# Patient Record
Sex: FEMALE | Race: BLACK OR AFRICAN AMERICAN | Employment: UNEMPLOYED | URBAN - METROPOLITAN AREA
[De-identification: names, ages, dates, MRNs, and addresses within clinical notes are randomized per-mention and may not be internally consistent; named-entity substitution may affect disease eponyms.]

---

## 2017-04-17 ENCOUNTER — HOSPITAL ENCOUNTER (OUTPATIENT)
Dept: MAMMOGRAPHY | Age: 57
Discharge: HOME | End: 2017-04-17
Attending: NURSE PRACTITIONER
Payer: COMMERCIAL

## 2017-04-17 DIAGNOSIS — Z78.0 POSTMENOPAUSAL: ICD-10-CM

## 2017-04-17 PROCEDURE — 77080 DXA BONE DENSITY AXIAL: CPT

## 2017-04-19 ENCOUNTER — APPOINTMENT (OUTPATIENT)
Dept: CT IMAGING | Age: 57
End: 2017-04-19
Attending: EMERGENCY MEDICINE
Payer: COMMERCIAL

## 2017-04-19 ENCOUNTER — HOSPITAL ENCOUNTER (EMERGENCY)
Age: 57
Discharge: HOME | End: 2017-04-20
Payer: COMMERCIAL

## 2017-04-19 VITALS
TEMPERATURE: 98.1 F | BODY MASS INDEX: 45.31 KG/M2 | WEIGHT: 240 LBS | DIASTOLIC BLOOD PRESSURE: 73 MMHG | RESPIRATION RATE: 16 BRPM | SYSTOLIC BLOOD PRESSURE: 129 MMHG | HEIGHT: 61 IN | OXYGEN SATURATION: 100 % | HEART RATE: 62 BPM

## 2017-04-19 DIAGNOSIS — R10.9 ACUTE LEFT FLANK PAIN: Primary | ICD-10-CM

## 2017-04-19 LAB
BASOPHILS # BLD AUTO: 0 K/UL (ref 0–0.2)
BASOPHILS # BLD: 1 % (ref 0–2)
DIFFERENTIAL METHOD BLD: ABNORMAL
EOSINOPHIL # BLD: 0.1 K/UL (ref 0–0.8)
EOSINOPHIL NFR BLD: 2 % (ref 0.5–7.8)
ERYTHROCYTE [DISTWIDTH] IN BLOOD BY AUTOMATED COUNT: 14.9 % (ref 11.9–14.6)
HCT VFR BLD AUTO: 38.3 % (ref 35.8–46.3)
HGB BLD-MCNC: 12.9 G/DL (ref 11.7–15.4)
IMM GRANULOCYTES # BLD: 0 K/UL (ref 0–0.5)
IMM GRANULOCYTES NFR BLD AUTO: 0 % (ref 0–5)
LYMPHOCYTES # BLD AUTO: 63 % (ref 13–44)
LYMPHOCYTES # BLD: 2.6 K/UL (ref 0.5–4.6)
MCH RBC QN AUTO: 29.3 PG (ref 26.1–32.9)
MCHC RBC AUTO-ENTMCNC: 33.7 G/DL (ref 31.4–35)
MCV RBC AUTO: 86.8 FL (ref 79.6–97.8)
MONOCYTES # BLD: 0.3 K/UL (ref 0.1–1.3)
MONOCYTES NFR BLD AUTO: 8 % (ref 4–12)
NEUTS SEG # BLD: 1.1 K/UL (ref 1.7–8.2)
NEUTS SEG NFR BLD AUTO: 26 % (ref 43–78)
PLATELET # BLD AUTO: 303 K/UL (ref 150–450)
PMV BLD AUTO: 10.4 FL (ref 10.8–14.1)
RBC # BLD AUTO: 4.41 M/UL (ref 4.05–5.25)
WBC # BLD AUTO: 4.1 K/UL (ref 4.3–11.1)

## 2017-04-19 PROCEDURE — 85025 COMPLETE CBC W/AUTO DIFF WBC: CPT

## 2017-04-19 PROCEDURE — 80053 COMPREHEN METABOLIC PANEL: CPT

## 2017-04-19 PROCEDURE — 81003 URINALYSIS AUTO W/O SCOPE: CPT

## 2017-04-19 PROCEDURE — 74176 CT ABD & PELVIS W/O CONTRAST: CPT

## 2017-04-19 PROCEDURE — 99283 EMERGENCY DEPT VISIT LOW MDM: CPT

## 2017-04-20 LAB
ALBUMIN SERPL BCP-MCNC: 3.9 G/DL (ref 3.5–5)
ALBUMIN/GLOB SERPL: 1.1 {RATIO} (ref 1.2–3.5)
ALP SERPL-CCNC: 78 U/L (ref 50–136)
ALT SERPL-CCNC: 34 U/L (ref 12–65)
ANION GAP BLD CALC-SCNC: 8 MMOL/L (ref 7–16)
AST SERPL W P-5'-P-CCNC: 29 U/L (ref 15–37)
BILIRUB SERPL-MCNC: 0.3 MG/DL (ref 0.2–1.1)
BUN SERPL-MCNC: 15 MG/DL (ref 6–23)
CALCIUM SERPL-MCNC: 8.2 MG/DL (ref 8.3–10.4)
CHLORIDE SERPL-SCNC: 106 MMOL/L (ref 98–107)
CO2 SERPL-SCNC: 24 MMOL/L (ref 21–32)
CREAT SERPL-MCNC: 0.94 MG/DL (ref 0.6–1)
GLOBULIN SER CALC-MCNC: 3.6 G/DL (ref 2.3–3.5)
GLUCOSE SERPL-MCNC: 94 MG/DL (ref 65–100)
POTASSIUM SERPL-SCNC: 4.2 MMOL/L (ref 3.5–5.1)
PROT SERPL-MCNC: 7.5 G/DL (ref 6.3–8.2)
SODIUM SERPL-SCNC: 138 MMOL/L (ref 136–145)

## 2017-04-20 RX ORDER — TRAMADOL HYDROCHLORIDE 50 MG/1
50 TABLET ORAL
Qty: 10 TAB | Refills: 0 | Status: SHIPPED | OUTPATIENT
Start: 2017-04-20 | End: 2018-04-10

## 2017-04-20 RX ORDER — PHENAZOPYRIDINE HYDROCHLORIDE 200 MG/1
200 TABLET, FILM COATED ORAL 3 TIMES DAILY
Qty: 6 TAB | Refills: 0 | Status: SHIPPED | OUTPATIENT
Start: 2017-04-20 | End: 2017-04-22

## 2017-04-20 NOTE — ED PROVIDER NOTES
HPI Comments: 78-year-old female left flank pain. Patient was seen in an ER earlier and diagnosed with a UTI started on antibiotics however continues to have some increasing pain left flank. Patient is a 55 y.o. female presenting with flank pain. The history is provided by the patient. Flank Pain    This is a new problem. The current episode started more than 2 days ago. The problem has not changed since onset. The problem occurs constantly. The pain is associated with no known injury. The quality of the pain is described as aching. The pain is at a severity of 10/10. The pain is severe. Associated symptoms include dysuria. Pertinent negatives include no chest pain, no fever, no headaches, no abdominal pain, no abdominal swelling, no bladder incontinence, no paresthesias and no paresis. She has tried nothing for the symptoms. Risk factors include a history of osteoporosis and obesity. The patient's surgical history non-contributory        Past Medical History:   Diagnosis Date    Depression 10/22/2015    Gastrointestinal disorder     GERD    Other ill-defined conditions     anemic       Past Surgical History:   Procedure Laterality Date    HX CHOLECYSTECTOMY      HX GASTRIC BYPASS  2005    [facility]    HX ORTHOPAEDIC      right ankle    HX OTHER SURGICAL      gastric bypass    HX TONSILLECTOMY      HX TUBAL LIGATION           Family History:   Problem Relation Age of Onset    Diabetes Mother     Heart Disease Mother     Hypertension Mother     Hypertension Father     Cancer Father      brain       Social History     Social History    Marital status: SINGLE     Spouse name: N/A    Number of children: N/A    Years of education: N/A     Occupational History    Not on file.      Social History Main Topics    Smoking status: Never Smoker    Smokeless tobacco: Never Used    Alcohol use Yes      Comment: occasionally    Drug use: No    Sexual activity: Yes     Partners: Male     Birth control/ protection: Surgical     Other Topics Concern    Not on file     Social History Narrative         ALLERGIES: Oxycodone and Phenergan [promethazine]    Review of Systems   Constitutional: Negative. Negative for activity change and fever. HENT: Negative. Eyes: Negative. Respiratory: Negative. Cardiovascular: Negative. Negative for chest pain. Gastrointestinal: Negative. Negative for abdominal pain. Genitourinary: Positive for dysuria and flank pain. Negative for bladder incontinence. Skin: Negative. Neurological: Negative. Negative for headaches and paresthesias. Psychiatric/Behavioral: Negative. All other systems reviewed and are negative. Vitals:    04/19/17 2124   BP: 129/73   Pulse: 62   Resp: 16   Temp: 98.1 °F (36.7 °C)   SpO2: 100%   Weight: 108.9 kg (240 lb)   Height: 5' 1\" (1.549 m)            Physical Exam   Constitutional: She is oriented to person, place, and time. She appears well-developed and well-nourished. No distress. HENT:   Head: Normocephalic and atraumatic. Right Ear: External ear normal.   Left Ear: External ear normal.   Nose: Nose normal.   Mouth/Throat: Oropharynx is clear and moist. No oropharyngeal exudate. Eyes: Conjunctivae and EOM are normal. Pupils are equal, round, and reactive to light. Right eye exhibits no discharge. Left eye exhibits no discharge. No scleral icterus. Neck: Normal range of motion. Neck supple. No JVD present. No tracheal deviation present. Cardiovascular: Normal rate, regular rhythm and intact distal pulses. Pulmonary/Chest: Effort normal and breath sounds normal. No stridor. No respiratory distress. She has no wheezes. She exhibits no tenderness. Abdominal: Soft. Bowel sounds are normal. She exhibits no distension and no mass. There is no tenderness. Musculoskeletal: Normal range of motion. She exhibits no edema or tenderness. Neurological: She is alert and oriented to person, place, and time.  No cranial nerve deficit. Skin: Skin is warm and dry. No rash noted. She is not diaphoretic. No erythema. No pallor. Psychiatric: She has a normal mood and affect. Her behavior is normal. Thought content normal.   Nursing note and vitals reviewed.        MDM  Number of Diagnoses or Management Options     Amount and/or Complexity of Data Reviewed  Clinical lab tests: ordered and reviewed  Tests in the radiology section of CPT®: ordered and reviewed  Tests in the medicine section of CPT®: ordered and reviewed    Risk of Complications, Morbidity, and/or Mortality  Presenting problems: high  Diagnostic procedures: high  Management options: high      ED Course       Procedures

## 2017-04-20 NOTE — ED TRIAGE NOTES
Pt reports flank pain to left side x about 3 weeks. Pt states she developed urinary frequency. Pt states she was seen by primary care doctor and told uti. Pt states she has been on bactrim for 9 days. Pt reports continued left flank pain. Pt states history of kidney stones. Pt states pain feels similar.

## 2018-04-10 PROBLEM — E66.01 OBESITY, MORBID (HCC): Status: ACTIVE | Noted: 2018-04-10

## 2019-07-23 ENCOUNTER — HOSPITAL ENCOUNTER (OUTPATIENT)
Dept: GENERAL RADIOLOGY | Age: 59
Discharge: HOME | End: 2019-07-23
Attending: NURSE PRACTITIONER
Payer: MEDICARE
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
## 2019-07-23 DIAGNOSIS — M25.562 ACUTE PAIN OF LEFT KNEE: ICD-10-CM

## 2019-07-23 PROCEDURE — 73560 X-RAY EXAM OF KNEE 1 OR 2: CPT

## 2020-05-28 ENCOUNTER — HOSPITAL ENCOUNTER (OUTPATIENT)
Dept: MAMMOGRAPHY | Age: 60
Discharge: HOME | End: 2020-05-28
Attending: NURSE PRACTITIONER

## 2020-05-28 DIAGNOSIS — Z12.39 SCREENING FOR MALIGNANT NEOPLASM OF BREAST: ICD-10-CM

## 2022-03-19 PROBLEM — E66.01 OBESITY, MORBID (HCC): Status: ACTIVE | Noted: 2018-04-10

## 2022-08-26 ENCOUNTER — TELEPHONE (OUTPATIENT)
Dept: ORTHOPEDIC SURGERY | Age: 62
End: 2022-08-26

## 2022-08-26 NOTE — TELEPHONE ENCOUNTER
Called and LVM to return call to ask about previous treatment. Appears she had MRI L shoulder at [facility] ordered by Dr. Gerson Go. Need to know about any previous treatment, especially sx, on shoulder.

## 2022-08-31 ENCOUNTER — OFFICE VISIT (OUTPATIENT)
Dept: ORTHOPEDIC SURGERY | Age: 62
End: 2022-08-31
Payer: COMMERCIAL

## 2022-08-31 VITALS — WEIGHT: 209 LBS | HEIGHT: 61 IN | BODY MASS INDEX: 39.46 KG/M2

## 2022-08-31 DIAGNOSIS — M75.122 NONTRAUMATIC COMPLETE TEAR OF LEFT ROTATOR CUFF: Primary | ICD-10-CM

## 2022-08-31 DIAGNOSIS — M19.012 DEGENERATIVE JOINT DISEASE OF LEFT ACROMIOCLAVICULAR JOINT: ICD-10-CM

## 2022-08-31 DIAGNOSIS — M75.22 BICIPITAL TENDINITIS OF LEFT SHOULDER: ICD-10-CM

## 2022-08-31 PROCEDURE — 99204 OFFICE O/P NEW MOD 45 MIN: CPT | Performed by: ORTHOPAEDIC SURGERY

## 2022-08-31 NOTE — PROGRESS NOTES
Name: Jena Conway  YOB: 1960  Gender: female  MRN: [id number]      What: Left shoulder pain  How: Insidious onset  When: 4 months duration but worse over the past month    Referring provider: Dr. Akshat Alicea    HPI: Jena Conway is a 62 y.o. left-hand-dominant female seen at the request of Dr. Akshat Alicea for left shoulder problems. She notes a several month history of left shoulder pain. She denies any trauma. She has pain pain at night pain that wakes her up at night difficulty and snapping her bra and weakness. She carries a diagnosis of chronic pain on Norco 10 3 times a day. She denies any other major medical issues. ROS/Meds/PSH/PMH/FH/SH: A ten system review of systems was performed and is negative other than what is in the HPI. Tobacco:  reports that she has never smoked. She has never used smokeless tobacco.  There were no vitals taken for this visit. Physical Examination:  She is an awake alert female ambulating without difficulty    The right shoulder has 0 to 180 degrees of active and 0 to 180 degrees passive forward elevation. Internal rotation is to T6. External rotation is to 60 degrees at the side. In the 90 degree abducted position 90 degrees of external and 90 degrees internal rotation  The AC joint is non-tender  SC joint is non-tender. Greater tuberosity is non-tender. negative biceps  Negative O'Briens sign  negative lift-off sign  Negative belly press sign  Negative bear huggers sign  negative drop sign  negative hornblower's sign  No posterior glenohumeral joint line tenderness. No evident excessive external rotation  Rotator cuff strength is 5/5.  negative external rotation stress test.   Negative empty can sign  There is no evident anterior or posterior apprehension with a negative sulcus sign. No instability  negative external and internal Rotation lag sign  Neurovascularly intact.       The left shoulder has 0 to 150 degrees of active and 0 to 180 degrees passive forward elevation. Pain in the overhead position  Internal rotation is to T6. External rotation is to 60 degrees at the side. In the 90 degree abducted position 90 degrees of external and 90 degrees internal rotation  The AC joint is tender  SC joint is non-tender. Greater tuberosity is tender. Positive bicipital stress test negative Omid sign  Negative O'Briens sign  negative lift-off sign  Negative belly press sign  Negative bear huggers sign  negative drop sign  negative hornblower's sign  No posterior glenohumeral joint line tenderness. No evident excessive external rotation  Rotator cuff strength is 5-/5 with weakness  Positive external rotation stress test.   Positive empty can sign  There is no evident anterior or posterior apprehension with a negative sulcus sign. No instability  negative external and internal Rotation lag sign  Neurovascularly intact. Data Reviewed:          XR: AP AP scapular outlet throwers and axillary views left shoulder   Clinical Indication    ICD-10-CM    1. Nontraumatic complete tear of left rotator cuff  M75.122 XR SHOULDER LEFT (MIN 2 VIEWS)      2. Bicipital tendinitis of left shoulder  M75.22       3. Degenerative joint disease of left acromioclavicular joint  M19.012          Report: AP AP scapular outlet throws and axillary views left shoulder demonstrate a type II acromion. Degenerative changes in the [facility] joint. Glenohumeral joint spaces preserved. No fracture. No dislocation. Impression: AC OA left shoulder   Nina Benitez MD       MRI left shoulder dated 7/18/2022 demonstrates a type II acromion. Degenerative changes AC joint. A full-thickness rotator cuff tear. Fluid around the biceps tendon. No atrophy. Glenohumeral reticular cartilage is intact. Impression:   1. Nontraumatic complete tear of left rotator cuff    2. Bicipital tendinitis of left shoulder    3.  Degenerative joint disease of left acromioclavicular joint       Chronic pain on Norco 10 mg 3 times a day  Overweight 5 foot 1 209 pounds BMI 39 status post gastric bypass    Plan:   I discussed the problem with the patient. I discussed nonoperative versus operative intervention including injections. She has a symptomatic rotator cuff tear left shoulder that is affecting her quality life. In my opinion she has exhausted nonoperative modalities. She would be a candidate for arthroscopy left shoulder ASD without acromioplasty,  ADCR, extensive debridement SLAP tear, biceps labral complex, glenohumeral joint, subacromial space, mini open rotator cuff repair and biceps tenodesis. This would be performed utilizing general anesthesia with interscalene block on an outpatient basis. I would measure a hemoglobin A1c and a fasting blood glucose. I discussed the risks and benefits of the procedure with her and expected outcomes particularly in light of her pain issues. We will be very careful about her narcotic utilization in the postoperative period. We will handle her narcotic pain medication and then returned her back to pain management when we feel it is appropriate. Given her obesity and narcotic pain issues and utilization she is at increased risk for postoperative complications. Again I had a lengthy discussion we will proceed as outlined above  4 This is a chronic illness/condition with exacerbation and progression    Follow up: No follow-ups on file.      Vicente Cardoza MD

## 2022-10-17 DIAGNOSIS — M75.122 NONTRAUMATIC COMPLETE TEAR OF LEFT ROTATOR CUFF: Primary | ICD-10-CM

## 2022-10-17 PROBLEM — M19.012 DEGENERATIVE JOINT DISEASE OF LEFT ACROMIOCLAVICULAR JOINT: Status: ACTIVE | Noted: 2022-10-17

## 2022-10-17 PROBLEM — M75.22 BICIPITAL TENDINITIS OF LEFT SHOULDER: Status: ACTIVE | Noted: 2022-10-17

## 2022-11-14 RX ORDER — FLUOXETINE HYDROCHLORIDE 20 MG/1
CAPSULE ORAL 2 TIMES DAILY
COMMUNITY
Start: 2022-09-30

## 2022-11-14 RX ORDER — TOPIRAMATE 25 MG/1
25 TABLET ORAL 2 TIMES DAILY
COMMUNITY
Start: 2022-09-29

## 2022-11-14 RX ORDER — METHOCARBAMOL 750 MG/1
TABLET, FILM COATED ORAL 2 TIMES DAILY PRN
COMMUNITY
Start: 2022-10-06

## 2022-11-14 RX ORDER — ACETAMINOPHEN 500 MG
500 TABLET ORAL EVERY 6 HOURS PRN
COMMUNITY
End: 2022-11-14

## 2022-11-14 RX ORDER — PHENTERMINE HYDROCHLORIDE 37.5 MG/1
TABLET ORAL
COMMUNITY
Start: 2022-09-02

## 2022-11-14 RX ORDER — TEMAZEPAM 30 MG/1
CAPSULE ORAL NIGHTLY
COMMUNITY
Start: 2022-10-07

## 2022-11-14 NOTE — PROGRESS NOTES
Patient verified name and . Order for consent not found in EHR and unable to match consent with case posting; patient verifies procedure. Type 1b surgery, phone assessment complete. Orders not received. Labs per surgeon: unknown, no orders  Labs per anesthesia protocol: none    Patient answered medical/surgical history questions at their best of ability. All prior to admission medications documented in [facility]. Patient instructed to take the following medications the day of surgery according to anesthesia guidelines with a small sip of water: Prozac, Omeprazole; Norco, if needed On the day before surgery please take Acetaminophen 1000mg in the morning and then again before bed. You may substitute for Tylenol 650 mg. Hold all vitamins 7 days prior to surgery and NSAIDS 5 days prior to surgery. Prescription meds to hold:Celebrex and weight loss medications for 5 days prior to surgery  Patient instructed on the following:    > Arrive at A Entrance, time of arrival to be called the day before by 1700  > NPO after midnight, unless otherwise indicated, including gum, mints, and ice chips  > Responsible adult must drive patient to the hospital, stay during surgery, and patient will need supervision 24 hours after anesthesia  > Use Hibiclens in shower the night before surgery and on the morning of surgery  > All piercings must be removed prior to arrival.    > Leave all valuables (money and jewelry) at home but bring insurance card and ID on DOS.   > You may be required to pay a deductible or co-pay on the day of your procedure. You can pre-pay by calling [phone] if your surgery is at the [facility] or [phone] if your surgery is at the [facility]. > Do not wear make-up, nail polish, lotions, cologne, perfumes, powders, or oil on skin. Artificial nails are not permitted.

## 2022-11-15 NOTE — H&P
Subjective:     Patient is a 62 y.o. LHD FEMALE WITH LEFT SHOULDER PAIN. SEE OFFICE NOTE. Patient Active Problem List    Diagnosis Date Noted    Nontraumatic complete tear of left rotator cuff 10/17/2022    Bicipital tendinitis of left shoulder 10/17/2022    Degenerative joint disease of left acromioclavicular joint 10/17/2022    Obesity, morbid ([address] 04/10/2018    Menopausal symptoms 05/05/2016    Right ankle pain 10/22/2015    Gastroesophageal reflux disease without esophagitis 10/22/2015    Depression 10/22/2015     Past Medical History:   Diagnosis Date    Abnormal Pap smear of cervix     Degenerative joint disease of left acromioclavicular joint 10/17/2022    Depression 10/22/2015    Gastrointestinal disorder     GERD    Other ill-defined conditions(799.89)     anemic    Prolonged emergence from general anesthesia       Past Surgical History:   Procedure Laterality Date    CHOLECYSTECTOMY      COLONOSCOPY      due again in [facility]  2005    [facility]    ORTHOPEDIC SURGERY      right ankle    OTHER SURGICAL HISTORY      gastric bypass    TONSILLECTOMY      TUBAL LIGATION        Prior to Admission medications    Medication Sig Start Date End Date Taking? Authorizing Provider   Multiple Vitamin (MULTIVITAMIN ADULT PO) Take by mouth daily   Yes Historical Provider, MD   Acetaminophen (TYLENOL ARTHRITIS PAIN PO) Take by mouth as needed   Yes Historical Provider, MD   topiramate (TOPAMAX) 25 MG tablet 25 mg in the morning and at bedtime 9/29/22   Historical Provider, MD   phentermine (ADIPEX-P) 37.5 MG tablet TAKE 0.5 TABLETS BY MOUTH TWICE A DAY 9/2/22   Historical Provider, MD   temazepam (RESTORIL) 30 MG capsule nightly.  10/7/22   Historical Provider, MD   FLUoxetine (PROZAC) 20 MG capsule in the morning and at bedtime 9/30/22   Historical Provider, MD   methocarbamol (ROBAXIN) 750 MG tablet 2 times daily as needed 10/6/22   Historical Provider, MD   celecoxib (CELEBREX) 100 MG capsule Take 100 mg by mouth 2 times daily TAKE 1 CAPSULE BY MOUTH TWICE A DAY 11/6/19   Ar Automatic Reconciliation   HYDROcodone-acetaminophen (NORCO)  MG per tablet Take by mouth. Ar Automatic Reconciliation   lubiprostone (AMITIZA) 24 MCG capsule Take 24 mcg by mouth 2 times daily (with meals) TAKE 1 CAPSULE BY MOUTH TWO (2) TIMES DAILY (WITH MEALS). 8/18/20   Ar Automatic Reconciliation   omeprazole (PRILOSEC) 40 MG delayed release capsule TAKE 1 CAPSULE BY MOUTH EVERY DAY 5/14/20   Ar Automatic Reconciliation   ondansetron (ZOFRAN-ODT) 4 MG disintegrating tablet TAKE 1 TABLET BY MOUTH EVERY 8 HOURS AS NEEDED FOR NAUSEA 5/14/20   Ar Automatic Reconciliation   tiZANidine (ZANAFLEX) 4 MG tablet Take 4 mg by mouth 2 times daily 3/28/19   Ar Automatic Reconciliation     Allergies   Allergen Reactions    Oxycodone Itching    Promethazine Other (See Comments)     Makes her jump    Sulfur Other (See Comments)     Yeast infection    Sulfa Antibiotics Rash      Social History     Tobacco Use    Smoking status: Never     Passive exposure: Never    Smokeless tobacco: Never   Substance Use Topics    Alcohol use: Yes     Comment: occasionally      Family History   Problem Relation Age of Onset    Hypertension Mother     Diabetes Mother     Heart Disease Mother     Hypertension Father     Cancer Father         brain      Review of Systems  Pertinent items are noted in HPI. Objective:     No data found.   Ht 5' 1\" (1.549 m)   Wt 206 lb (93.4 kg)   BMI 38.92 kg/m²     General Appearance:    Alert, cooperative, no distress, appears stated age   Head:    Normocephalic, without obvious abnormality, atraumatic                       Back:     Symmetric, no curvature, ROM normal, no CVA tenderness   Lungs:     Clear to auscultation bilaterally, respirations unlabored   Chest Wall:    No tenderness or deformity    Heart:    Regular rate and rhythm, S1 and S2 normal, no murmur, rub   or gallop Extremities:   Extremities normal, atraumatic, no cyanosis or edema   Pulses:   2+ and symmetric all extremities   Skin:   Skin color, texture, turgor normal, no rashes or lesions   Lymph nodes:   Cervical, supraclavicular, and axillary nodes normal   Neurologic:   CNII-XII intact, normal strength, sensation and reflexes     throughout         Assessment:     Active Problems:    Nontraumatic complete tear of left rotator cuff    Bicipital tendinitis of left shoulder    Degenerative joint disease of left acromioclavicular joint  Resolved Problems:    * No resolved hospital problems. *      Plan:     The various methods of treatment have been discussed with the patient and family. PATIENT HAS EXHAUSTED NON-OPERATIVE MODALITIES     After consideration of risks, benefits and other options for treatment, the patient has consented to surgical intervention.     SEE OFFICE NOTE    Keenan Smith MD

## 2022-11-18 ENCOUNTER — HOSPITAL ENCOUNTER (OUTPATIENT)
Age: 62
Setting detail: OUTPATIENT SURGERY
Discharge: HOME | End: 2022-11-18
Attending: ORTHOPAEDIC SURGERY | Admitting: ORTHOPAEDIC SURGERY
Payer: MEDICARE

## 2022-11-18 ENCOUNTER — ANESTHESIA EVENT (OUTPATIENT)
Dept: SURGERY | Age: 62
End: 2022-11-18
Payer: MEDICARE

## 2022-11-18 ENCOUNTER — PREP FOR PROCEDURE (OUTPATIENT)
Dept: ORTHOPEDIC SURGERY | Age: 62
End: 2022-11-18

## 2022-11-18 ENCOUNTER — APPOINTMENT (OUTPATIENT)
Dept: GENERAL RADIOLOGY | Age: 62
End: 2022-11-18
Attending: ORTHOPAEDIC SURGERY
Payer: MEDICARE

## 2022-11-18 ENCOUNTER — ANESTHESIA (OUTPATIENT)
Dept: SURGERY | Age: 62
End: 2022-11-18
Payer: MEDICARE

## 2022-11-18 VITALS
WEIGHT: 210 LBS | TEMPERATURE: 97.8 F | SYSTOLIC BLOOD PRESSURE: 164 MMHG | RESPIRATION RATE: 16 BRPM | BODY MASS INDEX: 39.65 KG/M2 | HEART RATE: 51 BPM | HEIGHT: 61 IN | OXYGEN SATURATION: 96 % | DIASTOLIC BLOOD PRESSURE: 70 MMHG

## 2022-11-18 DIAGNOSIS — M75.122 NONTRAUMATIC COMPLETE TEAR OF LEFT ROTATOR CUFF: ICD-10-CM

## 2022-11-18 DIAGNOSIS — M19.012 DEGENERATIVE JOINT DISEASE OF LEFT ACROMIOCLAVICULAR JOINT: ICD-10-CM

## 2022-11-18 DIAGNOSIS — M75.122 NONTRAUMATIC COMPLETE TEAR OF LEFT ROTATOR CUFF: Primary | ICD-10-CM

## 2022-11-18 DIAGNOSIS — M75.22 BICIPITAL TENDINITIS OF LEFT SHOULDER: ICD-10-CM

## 2022-11-18 PROBLEM — S43.432A SUPERIOR GLENOID LABRUM LESION OF LEFT SHOULDER: Status: ACTIVE | Noted: 2022-11-18

## 2022-11-18 LAB
EST. AVERAGE GLUCOSE BLD GHB EST-MCNC: 120 MG/DL
GLUCOSE BLD STRIP.AUTO-MCNC: 90 MG/DL (ref 65–100)
HBA1C MFR BLD: 5.8 % (ref 4.8–5.6)
SERVICE CMNT-IMP: NORMAL

## 2022-11-18 PROCEDURE — 7100000001 HC PACU RECOVERY - ADDTL 15 MIN: Performed by: ORTHOPAEDIC SURGERY

## 2022-11-18 PROCEDURE — 6360000002 HC RX W HCPCS: Performed by: ANESTHESIOLOGY

## 2022-11-18 PROCEDURE — 6360000002 HC RX W HCPCS: Performed by: NURSE ANESTHETIST, CERTIFIED REGISTERED

## 2022-11-18 PROCEDURE — 3600000014 HC SURGERY LEVEL 4 ADDTL 15MIN: Performed by: ORTHOPAEDIC SURGERY

## 2022-11-18 PROCEDURE — 83036 HEMOGLOBIN GLYCOSYLATED A1C: CPT

## 2022-11-18 PROCEDURE — 7100000000 HC PACU RECOVERY - FIRST 15 MIN: Performed by: ORTHOPAEDIC SURGERY

## 2022-11-18 PROCEDURE — 6370000000 HC RX 637 (ALT 250 FOR IP): Performed by: ANESTHESIOLOGY

## 2022-11-18 PROCEDURE — 73030 X-RAY EXAM OF SHOULDER: CPT

## 2022-11-18 PROCEDURE — 2500000003 HC RX 250 WO HCPCS: Performed by: ANESTHESIOLOGY

## 2022-11-18 PROCEDURE — 29824 SHO ARTHRS SRG DSTL CLAVICLC: CPT | Performed by: ORTHOPAEDIC SURGERY

## 2022-11-18 PROCEDURE — 2580000003 HC RX 258: Performed by: ANESTHESIOLOGY

## 2022-11-18 PROCEDURE — 2500000003 HC RX 250 WO HCPCS: Performed by: NURSE ANESTHETIST, CERTIFIED REGISTERED

## 2022-11-18 PROCEDURE — 2500000003 HC RX 250 WO HCPCS: Performed by: ORTHOPAEDIC SURGERY

## 2022-11-18 PROCEDURE — 23412 REPAIR ROTATOR CUFF CHRONIC: CPT | Performed by: ORTHOPAEDIC SURGERY

## 2022-11-18 PROCEDURE — 29823 SHO ARTHRS SRG XTNSV DBRDMT: CPT | Performed by: ORTHOPAEDIC SURGERY

## 2022-11-18 PROCEDURE — 3700000001 HC ADD 15 MINUTES (ANESTHESIA): Performed by: ORTHOPAEDIC SURGERY

## 2022-11-18 PROCEDURE — 3600000004 HC SURGERY LEVEL 4 BASE: Performed by: ORTHOPAEDIC SURGERY

## 2022-11-18 PROCEDURE — C1713 ANCHOR/SCREW BN/BN,TIS/BN: HCPCS | Performed by: ORTHOPAEDIC SURGERY

## 2022-11-18 PROCEDURE — 82962 GLUCOSE BLOOD TEST: CPT

## 2022-11-18 PROCEDURE — 2580000003 HC RX 258: Performed by: NURSE ANESTHETIST, CERTIFIED REGISTERED

## 2022-11-18 PROCEDURE — 76942 ECHO GUIDE FOR BIOPSY: CPT | Performed by: ANESTHESIOLOGY

## 2022-11-18 PROCEDURE — 2709999900 HC NON-CHARGEABLE SUPPLY: Performed by: ORTHOPAEDIC SURGERY

## 2022-11-18 PROCEDURE — 23430 REPAIR BICEPS TENDON: CPT | Performed by: ORTHOPAEDIC SURGERY

## 2022-11-18 PROCEDURE — 3700000000 HC ANESTHESIA ATTENDED CARE: Performed by: ORTHOPAEDIC SURGERY

## 2022-11-18 PROCEDURE — 6360000002 HC RX W HCPCS: Performed by: ORTHOPAEDIC SURGERY

## 2022-11-18 DEVICE — 5.5MM PEEK ZIP SUTURE ANCHOR WITH ¿ CIRCLE TAPER NEEDLES, #2 FORCE FIBER
Type: IMPLANTABLE DEVICE | Site: SHOULDER | Status: FUNCTIONAL
Brand: PEEK ZIP

## 2022-11-18 DEVICE — OMEGA 4.75MM PEEK KNOTLESS ANCHOR SYSTEM, SINGLE
Type: IMPLANTABLE DEVICE | Site: SHOULDER | Status: FUNCTIONAL
Brand: OMEGA

## 2022-11-18 DEVICE — ICONIX 2 NEEDLES WITH INTELLIBRAID TECHNOLOGY, 2.3MM ANCHOR WITH 2 STRANDS #2 FORCE FIBER
Type: IMPLANTABLE DEVICE | Site: SHOULDER | Status: FUNCTIONAL
Brand: ICONIX

## 2022-11-18 RX ORDER — KETOROLAC TROMETHAMINE 30 MG/ML
INJECTION, SOLUTION INTRAMUSCULAR; INTRAVENOUS PRN
Status: DISCONTINUED | OUTPATIENT
Start: 2022-11-18 | End: 2022-11-18 | Stop reason: SDUPTHER

## 2022-11-18 RX ORDER — HYDROMORPHONE HYDROCHLORIDE 1 MG/ML
0.5 INJECTION, SOLUTION INTRAMUSCULAR; INTRAVENOUS; SUBCUTANEOUS EVERY 5 MIN PRN
Status: DISCONTINUED | OUTPATIENT
Start: 2022-11-18 | End: 2022-11-21 | Stop reason: HOSPADM

## 2022-11-18 RX ORDER — ONDANSETRON 2 MG/ML
4 INJECTION INTRAMUSCULAR; INTRAVENOUS
Status: COMPLETED | OUTPATIENT
Start: 2022-11-18 | End: 2022-11-18

## 2022-11-18 RX ORDER — ONDANSETRON 4 MG/1
4 TABLET, FILM COATED ORAL EVERY 8 HOURS PRN
Qty: 20 TABLET | Refills: 0 | Status: SHIPPED | OUTPATIENT
Start: 2022-11-18

## 2022-11-18 RX ORDER — SODIUM CHLORIDE 0.9 % (FLUSH) 0.9 %
5-40 SYRINGE (ML) INJECTION PRN
Status: DISCONTINUED | OUTPATIENT
Start: 2022-11-18 | End: 2022-11-21 | Stop reason: HOSPADM

## 2022-11-18 RX ORDER — OXYCODONE HYDROCHLORIDE 5 MG/1
5 TABLET ORAL PRN
Status: COMPLETED | OUTPATIENT
Start: 2022-11-18 | End: 2022-11-18

## 2022-11-18 RX ORDER — SODIUM CHLORIDE 9 MG/ML
INJECTION, SOLUTION INTRAVENOUS PRN
Status: CANCELLED | OUTPATIENT
Start: 2022-11-18

## 2022-11-18 RX ORDER — OXYCODONE HYDROCHLORIDE 5 MG/1
10 TABLET ORAL PRN
Status: COMPLETED | OUTPATIENT
Start: 2022-11-18 | End: 2022-11-18

## 2022-11-18 RX ORDER — NEOSTIGMINE METHYLSULFATE 1 MG/ML
INJECTION, SOLUTION INTRAVENOUS PRN
Status: DISCONTINUED | OUTPATIENT
Start: 2022-11-18 | End: 2022-11-18 | Stop reason: SDUPTHER

## 2022-11-18 RX ORDER — GLYCOPYRROLATE 0.2 MG/ML
INJECTION INTRAMUSCULAR; INTRAVENOUS PRN
Status: DISCONTINUED | OUTPATIENT
Start: 2022-11-18 | End: 2022-11-18 | Stop reason: SDUPTHER

## 2022-11-18 RX ORDER — SODIUM CHLORIDE 0.9 % (FLUSH) 0.9 %
5-40 SYRINGE (ML) INJECTION PRN
Status: CANCELLED | OUTPATIENT
Start: 2022-11-18

## 2022-11-18 RX ORDER — HYDROMORPHONE HYDROCHLORIDE 2 MG/1
2 TABLET ORAL EVERY 6 HOURS PRN
Qty: 40 TABLET | Refills: 0 | Status: SHIPPED | OUTPATIENT
Start: 2022-11-18 | End: 2022-11-28

## 2022-11-18 RX ORDER — SODIUM CHLORIDE, SODIUM LACTATE, POTASSIUM CHLORIDE, CALCIUM CHLORIDE 600; 310; 30; 20 MG/100ML; MG/100ML; MG/100ML; MG/100ML
100 INJECTION, SOLUTION INTRAVENOUS CONTINUOUS
Status: DISCONTINUED | OUTPATIENT
Start: 2022-11-18 | End: 2022-11-18 | Stop reason: HOSPADM

## 2022-11-18 RX ORDER — LIDOCAINE HYDROCHLORIDE AND EPINEPHRINE BITARTRATE 20; .01 MG/ML; MG/ML
INJECTION, SOLUTION SUBCUTANEOUS PRN
Status: DISCONTINUED | OUTPATIENT
Start: 2022-11-18 | End: 2022-11-18 | Stop reason: HOSPADM

## 2022-11-18 RX ORDER — PROPOFOL 10 MG/ML
INJECTION, EMULSION INTRAVENOUS PRN
Status: DISCONTINUED | OUTPATIENT
Start: 2022-11-18 | End: 2022-11-18 | Stop reason: SDUPTHER

## 2022-11-18 RX ORDER — SODIUM CHLORIDE 0.9 % (FLUSH) 0.9 %
5-40 SYRINGE (ML) INJECTION EVERY 12 HOURS SCHEDULED
Status: CANCELLED | OUTPATIENT
Start: 2022-11-18

## 2022-11-18 RX ORDER — LIDOCAINE HYDROCHLORIDE 10 MG/ML
1 INJECTION, SOLUTION INFILTRATION; PERINEURAL
Status: COMPLETED | OUTPATIENT
Start: 2022-11-18 | End: 2022-11-18

## 2022-11-18 RX ORDER — SODIUM CHLORIDE 9 MG/ML
INJECTION, SOLUTION INTRAVENOUS PRN
Status: DISCONTINUED | OUTPATIENT
Start: 2022-11-18 | End: 2022-11-21 | Stop reason: HOSPADM

## 2022-11-18 RX ORDER — DEXAMETHASONE SODIUM PHOSPHATE 10 MG/ML
INJECTION INTRAMUSCULAR; INTRAVENOUS PRN
Status: DISCONTINUED | OUTPATIENT
Start: 2022-11-18 | End: 2022-11-18 | Stop reason: SDUPTHER

## 2022-11-18 RX ORDER — SODIUM CHLORIDE 0.9 % (FLUSH) 0.9 %
5-40 SYRINGE (ML) INJECTION EVERY 12 HOURS SCHEDULED
Status: DISCONTINUED | OUTPATIENT
Start: 2022-11-18 | End: 2022-11-21 | Stop reason: HOSPADM

## 2022-11-18 RX ORDER — LIDOCAINE HYDROCHLORIDE 20 MG/ML
INJECTION, SOLUTION EPIDURAL; INFILTRATION; INTRACAUDAL; PERINEURAL PRN
Status: DISCONTINUED | OUTPATIENT
Start: 2022-11-18 | End: 2022-11-18 | Stop reason: SDUPTHER

## 2022-11-18 RX ORDER — ROPIVACAINE HYDROCHLORIDE 10 MG/ML
INJECTION EPIDURAL; INFILTRATION; PERINEURAL PRN
Status: DISCONTINUED | OUTPATIENT
Start: 2022-11-18 | End: 2022-11-18 | Stop reason: SDUPTHER

## 2022-11-18 RX ORDER — FENTANYL CITRATE 50 UG/ML
100 INJECTION, SOLUTION INTRAMUSCULAR; INTRAVENOUS
Status: COMPLETED | OUTPATIENT
Start: 2022-11-18 | End: 2022-11-18

## 2022-11-18 RX ORDER — SODIUM CHLORIDE, SODIUM LACTATE, POTASSIUM CHLORIDE, CALCIUM CHLORIDE 600; 310; 30; 20 MG/100ML; MG/100ML; MG/100ML; MG/100ML
INJECTION, SOLUTION INTRAVENOUS CONTINUOUS PRN
Status: DISCONTINUED | OUTPATIENT
Start: 2022-11-18 | End: 2022-11-18 | Stop reason: SDUPTHER

## 2022-11-18 RX ORDER — GABAPENTIN 100 MG/1
100 CAPSULE ORAL 3 TIMES DAILY
Qty: 90 CAPSULE | Refills: 0 | Status: SHIPPED | OUTPATIENT
Start: 2022-11-18 | End: 2022-12-18

## 2022-11-18 RX ORDER — ROCURONIUM BROMIDE 10 MG/ML
INJECTION, SOLUTION INTRAVENOUS PRN
Status: DISCONTINUED | OUTPATIENT
Start: 2022-11-18 | End: 2022-11-18 | Stop reason: SDUPTHER

## 2022-11-18 RX ORDER — ONDANSETRON 2 MG/ML
INJECTION INTRAMUSCULAR; INTRAVENOUS PRN
Status: DISCONTINUED | OUTPATIENT
Start: 2022-11-18 | End: 2022-11-18 | Stop reason: SDUPTHER

## 2022-11-18 RX ORDER — SODIUM CHLORIDE, SODIUM LACTATE, POTASSIUM CHLORIDE, CALCIUM CHLORIDE 600; 310; 30; 20 MG/100ML; MG/100ML; MG/100ML; MG/100ML
INJECTION, SOLUTION INTRAVENOUS CONTINUOUS
Status: DISCONTINUED | OUTPATIENT
Start: 2022-11-18 | End: 2022-11-21 | Stop reason: HOSPADM

## 2022-11-18 RX ORDER — MIDAZOLAM HYDROCHLORIDE 2 MG/2ML
4 INJECTION, SOLUTION INTRAMUSCULAR; INTRAVENOUS
Status: COMPLETED | OUTPATIENT
Start: 2022-11-18 | End: 2022-11-18

## 2022-11-18 RX ORDER — KETOROLAC TROMETHAMINE 10 MG/1
TABLET, FILM COATED ORAL
Qty: 20 TABLET | Refills: 0 | Status: SHIPPED | OUTPATIENT
Start: 2022-11-18

## 2022-11-18 RX ADMIN — LIDOCAINE HYDROCHLORIDE 100 MG: 20 INJECTION, SOLUTION EPIDURAL; INFILTRATION; INTRACAUDAL; PERINEURAL at 13:26

## 2022-11-18 RX ADMIN — GLYCOPYRROLATE 0.4 MG: 0.2 INJECTION, SOLUTION INTRAMUSCULAR; INTRAVENOUS at 14:38

## 2022-11-18 RX ADMIN — PHENYLEPHRINE HYDROCHLORIDE 100 MCG: 0.1 INJECTION, SOLUTION INTRAVENOUS at 13:42

## 2022-11-18 RX ADMIN — SODIUM CHLORIDE, SODIUM LACTATE, POTASSIUM CHLORIDE, AND CALCIUM CHLORIDE: 600; 310; 30; 20 INJECTION, SOLUTION INTRAVENOUS at 13:19

## 2022-11-18 RX ADMIN — Medication 2 G: at 13:19

## 2022-11-18 RX ADMIN — MIDAZOLAM 2 MG: 1 INJECTION INTRAMUSCULAR; INTRAVENOUS at 11:23

## 2022-11-18 RX ADMIN — PHENYLEPHRINE HYDROCHLORIDE 100 MCG: 0.1 INJECTION, SOLUTION INTRAVENOUS at 14:02

## 2022-11-18 RX ADMIN — OXYCODONE 10 MG: 5 TABLET ORAL at 16:22

## 2022-11-18 RX ADMIN — PROPOFOL 200 MG: 10 INJECTION, EMULSION INTRAVENOUS at 13:26

## 2022-11-18 RX ADMIN — ONDANSETRON 4 MG: 2 INJECTION INTRAMUSCULAR; INTRAVENOUS at 13:34

## 2022-11-18 RX ADMIN — ROCURONIUM BROMIDE 40 MG: 50 INJECTION, SOLUTION INTRAVENOUS at 13:26

## 2022-11-18 RX ADMIN — HYDROMORPHONE HYDROCHLORIDE 0.5 MG: 1 INJECTION, SOLUTION INTRAMUSCULAR; INTRAVENOUS; SUBCUTANEOUS at 15:50

## 2022-11-18 RX ADMIN — ROPIVACAINE HYDROCHLORIDE 15 ML: 10 INJECTION, SOLUTION EPIDURAL at 11:23

## 2022-11-18 RX ADMIN — ONDANSETRON 4 MG: 2 INJECTION INTRAMUSCULAR; INTRAVENOUS at 15:00

## 2022-11-18 RX ADMIN — HYDROMORPHONE HYDROCHLORIDE 0.5 MG: 1 INJECTION, SOLUTION INTRAMUSCULAR; INTRAVENOUS; SUBCUTANEOUS at 15:35

## 2022-11-18 RX ADMIN — Medication 3 MG: at 14:38

## 2022-11-18 RX ADMIN — SODIUM CHLORIDE, POTASSIUM CHLORIDE, SODIUM LACTATE AND CALCIUM CHLORIDE 100 ML/HR: 600; 310; 30; 20 INJECTION, SOLUTION INTRAVENOUS at 10:20

## 2022-11-18 RX ADMIN — MEPIVACAINE HYDROCHLORIDE 15 ML: 15 INJECTION, SOLUTION EPIDURAL; INFILTRATION at 11:23

## 2022-11-18 RX ADMIN — LIDOCAINE HYDROCHLORIDE 1 ML: 10 INJECTION, SOLUTION INFILTRATION; PERINEURAL at 10:20

## 2022-11-18 RX ADMIN — DEXAMETHASONE SODIUM PHOSPHATE 10 MG: 10 INJECTION INTRAMUSCULAR; INTRAVENOUS at 13:34

## 2022-11-18 RX ADMIN — KETOROLAC TROMETHAMINE 30 MG: 30 INJECTION, SOLUTION INTRAMUSCULAR; INTRAVENOUS at 14:36

## 2022-11-18 RX ADMIN — FENTANYL CITRATE 50 MCG: 50 INJECTION INTRAMUSCULAR; INTRAVENOUS at 11:23

## 2022-11-18 ASSESSMENT — PAIN SCALES - GENERAL
PAINLEVEL_OUTOF10: 3
PAINLEVEL_OUTOF10: 4
PAINLEVEL_OUTOF10: 5
PAINLEVEL_OUTOF10: 4
PAINLEVEL_OUTOF10: 8
PAINLEVEL_OUTOF10: 2
PAINLEVEL_OUTOF10: 7
PAINLEVEL_OUTOF10: 2
PAINLEVEL_OUTOF10: 3
PAINLEVEL_OUTOF10: 6

## 2022-11-18 ASSESSMENT — PAIN DESCRIPTION - ORIENTATION
ORIENTATION: LEFT

## 2022-11-18 ASSESSMENT — PAIN DESCRIPTION - DESCRIPTORS
DESCRIPTORS: ACHING

## 2022-11-18 ASSESSMENT — PAIN DESCRIPTION - LOCATION: LOCATION: FINGER (COMMENT WHICH ONE);OTHER (COMMENT)

## 2022-11-18 ASSESSMENT — PAIN - FUNCTIONAL ASSESSMENT: PAIN_FUNCTIONAL_ASSESSMENT: 0-10

## 2022-11-18 NOTE — H&P
Update History & Physical    The patient's History and Physical of August 31, 2022 was reviewed with the patient and I examined the patient. There was no change. The surgical site was confirmed by the patient and me. Plan: The risks, benefits, expected outcome, and alternative to the recommended procedure have been discussed with the patient. Patient understands and wants to proceed with the procedure.      Electronically signed by Katerina Diaz MD on 11/18/2022 at 9:37 AM

## 2022-11-18 NOTE — PERIOP NOTE
Dr. Neal Ragland notified that patient c/o chest \"discomfort\" with deep breaths; Dr. Neal Ragland to bedside to speak with patient; no new orders received at this time.

## 2022-11-18 NOTE — ANESTHESIA PRE PROCEDURE
Department of Anesthesiology  Preprocedure Note       Name:  Rey Lockhart   Age:  62 y.o.  :  1960                                          MRN:  [id number]         Date:  2022      Surgeon: Krysten Hall):  Mahsa Tavera MD    Procedure: Procedure(s):  left shoulder arthroscopy, arthroscopic subacromial decompression without acromioplasty, arthroscopic distal clavicle resection, arthroscopic versus mini open rotator cuff repair and biceps tenodesis. general/interscalene    Medications prior to admission:   Prior to Admission medications    Medication Sig Start Date End Date Taking? Authorizing Provider   HYDROmorphone (DILAUDID) 2 MG tablet Take 1 tablet by mouth every 6 hours as needed for Pain for up to 10 days. 22 Yes Mahsa Tavera MD   gabapentin (NEURONTIN) 100 MG capsule Take 1 capsule by mouth 3 times daily for 30 days. 22 Yes Mahsa Tavera MD   ketorolac (TORADOL) 10 MG tablet Take 1 tablet by mouth every 6 hours for 5 days post-op 22  Yes Mahsa Tavera MD   ondansetron [facility]) 4 MG tablet Take 1 tablet by mouth every 8 hours as needed for Nausea or Vomiting 22  Yes Mahsa Tavera MD   Multiple Vitamin (MULTIVITAMIN ADULT PO) Take by mouth daily   Yes Historical Provider, MD   Acetaminophen (TYLENOL ARTHRITIS PAIN PO) Take by mouth as needed   Yes Historical Provider, MD   topiramate (TOPAMAX) 25 MG tablet 25 mg in the morning and at bedtime 22   Historical Provider, MD   phentermine (ADIPEX-P) 37.5 MG tablet TAKE 0.5 TABLETS BY MOUTH TWICE A DAY 22   Historical Provider, MD   temazepam (RESTORIL) 30 MG capsule nightly.  10/7/22   Historical Provider, MD   FLUoxetine (PROZAC) 20 MG capsule in the morning and at bedtime 22   Historical Provider, MD   methocarbamol (ROBAXIN) 750 MG tablet 2 times daily as needed 10/6/22   Historical Provider, MD   HYDROcodone-acetaminophen ([facility])  MG per tablet Take by mouth.    Ar Automatic Reconciliation   lubiprostone (AMITIZA) 24 MCG capsule Take 24 mcg by mouth 2 times daily (with meals) TAKE 1 CAPSULE BY MOUTH TWO (2) TIMES DAILY (WITH MEALS). 8/18/20   Ar Automatic Reconciliation   omeprazole (PRILOSEC) 40 MG delayed release capsule TAKE 1 CAPSULE BY MOUTH EVERY DAY 5/14/20   Ar Automatic Reconciliation   ondansetron (ZOFRAN-ODT) 4 MG disintegrating tablet TAKE 1 TABLET BY MOUTH EVERY 8 HOURS AS NEEDED FOR NAUSEA  Patient not taking: Reported on 11/18/2022 5/14/20   Ar Automatic Reconciliation   tiZANidine (ZANAFLEX) 4 MG tablet Take 4 mg by mouth 2 times daily  Patient not taking: Reported on 11/18/2022 3/28/19   Ar Automatic Reconciliation       Current medications:    Current Facility-Administered Medications   Medication Dose Route Frequency Provider Last Rate Last Admin    lactated ringers infusion  100 mL/hr IntraVENous Continuous Keshav Temple  mL/hr at 11/18/22 1020 100 mL/hr at 11/18/22 1020    ceFAZolin (ANCEF) 2000 mg in sterile water 20 mL IV syringe  2,000 mg IntraVENous On Call to Vedia Lanes., MD           Allergies: Allergies   Allergen Reactions    Oxycodone Itching    Promethazine Other (See Comments)     Makes her jump    Sulfur Other (See Comments)     Yeast infection    Sulfa Antibiotics Rash       Problem List:    Patient Active Problem List   Diagnosis Code    Right ankle pain M25.571    Obesity, morbid ([facility] [address] E66.01    Menopausal symptoms N95.1    Gastroesophageal reflux disease without esophagitis K21.9    Depression F32. A    Nontraumatic complete tear of left rotator cuff M75.122    Bicipital tendinitis of left shoulder M75.22    Degenerative joint disease of left acromioclavicular joint M19.012       Past Medical History:        Diagnosis Date    Abnormal Pap smear of cervix     Degenerative joint disease of left acromioclavicular joint 10/17/2022    Depression 10/22/2015    Gastrointestinal disorder GERD    Other ill-defined conditions(799.89)     anemic    Prolonged emergence from general anesthesia        Past Surgical History:        Procedure Laterality Date    CHOLECYSTECTOMY      COLONOSCOPY      due again in 2020   [address] GASTRIC BYPASS SURGERY  2005    [place]    ORTHOPEDIC SURGERY      right ankle    OTHER SURGICAL HISTORY      gastric bypass    TONSILLECTOMY      TUBAL LIGATION         Social History:    Social History     Tobacco Use    Smoking status: Never     Passive exposure: Never    Smokeless tobacco: Never   Substance Use Topics    Alcohol use: Yes     Comment: occasionally                                Counseling given: Not Answered      Vital Signs (Current):   Vitals:    11/18/22 1120 11/18/22 1125 11/18/22 1130 11/18/22 1135   BP: 133/78 101/60 (!) 106/50 (!) 108/54   Pulse: 58 58 54 72   Resp: 16 16 16 16   Temp:       TempSrc:       SpO2:       Weight:       Height:                                                  BP Readings from Last 3 Encounters:   11/18/22 (!) 108/54       NPO Status: Time of last liquid consumption: 0230                        Time of last solid consumption: 2359                        Date of last liquid consumption: 11/18/22                        Date of last solid food consumption: 11/17/22    BMI:   Wt Readings from Last 3 Encounters:   11/18/22 210 lb (95.3 kg)   08/31/22 209 lb (94.8 kg)     Body mass index is 39.68 kg/m².     CBC:   Lab Results   Component Value Date/Time    WBC 4.5 07/03/2019 12:33 PM    RBC 4.27 07/03/2019 12:33 PM    HGB 12.7 07/03/2019 12:33 PM    HCT 39.3 07/03/2019 12:33 PM    MCV 92 07/03/2019 12:33 PM    RDW 15.3 07/03/2019 12:33 PM     07/03/2019 12:33 PM       CMP:   Lab Results   Component Value Date/Time     07/03/2019 12:33 PM    K 4.7 07/03/2019 12:33 PM     07/03/2019 12:33 PM    CO2 23 07/03/2019 12:33 PM    BUN 16 07/03/2019 12:33 PM    CREATININE 0.91 07/03/2019 12:33 PM    GFRAA 81 07/03/2019 12:33 PM    AGRATIO 1.5 07/03/2019 12:33 PM    GLUCOSE 86 07/03/2019 12:33 PM    PROT 6.7 07/03/2019 12:33 PM    CALCIUM 9.2 07/03/2019 12:33 PM    BILITOT 0.2 07/03/2019 12:33 PM    ALKPHOS 87 07/03/2019 12:33 PM    AST 30 07/03/2019 12:33 PM    ALT 26 07/03/2019 12:33 PM       POC Tests:   Recent Labs     11/18/22  1032   POCGLU 90       Coags: No results found for: PROTIME, INR, APTT    HCG (If Applicable): No results found for: PREGTESTUR, PREGSERUM, HCG, HCGQUANT     ABGs: No results found for: PHART, PO2ART, DVN9AJA, ZXU0CDM, BEART, F8YEVJEM     Type & Screen (If Applicable):  No results found for: LABABO, LABRH    Drug/Infectious Status (If Applicable):  No results found for: HIV, HEPCAB    COVID-19 Screening (If Applicable): No results found for: COVID19        Anesthesia Evaluation  Patient summary reviewed  Airway: Mallampati: II  TM distance: >3 FB   Neck ROM: full  Mouth opening: > = 3 FB   Dental:          Pulmonary:Negative Pulmonary ROS and normal exam                               Cardiovascular:  Exercise tolerance: good (>4 METS),                     Neuro/Psych:   Negative Neuro/Psych ROS              GI/Hepatic/Renal:   (+) GERD: well controlled, morbid obesity          Endo/Other:                     Abdominal:             Vascular: negative vascular ROS. Other Findings:           Anesthesia Plan      general     ASA 3             Anesthetic plan and risks discussed with patient.               Post-op pain plan if not by surgeon: single peripheral nerve block            Yolanda Foster MD   11/18/2022

## 2022-11-18 NOTE — BRIEF OP NOTE
BRIEF OPERATIVE NOTE    Date of Procedure: 11/18/2022    Preoperative Diagnosis:  ROTATOR CUFF TEAR LEFT SHOULDER      BICEPS TENDINITIS LEFT SHOULDER      AC OA LEFT SHOULDER    Postoperative Diagnosis:  SAME      SLAP TEAR LEFT SHOULDER    Procedure(s):  ARTHROSCOPY LEFT SHOULDER ARTHROSCOPIC SUBACROMIAL DECOMPRESSION, DISTAL CLAVICLE RESECTION, EXTENSIVE DEBRIDEMENT SLAP TEAR, BICEPS LABRAL COMPLEX, GLENOHUMERAL JOINT, SUBACROMIAL SPACE, MINI OPEN ROTATOR CUFF REPAIR, BICEPS TENODESIS    Surgeon(s) and Role:     * Tatiana Payne MD - Primary         Assistant Staff:  NONE    Surgical Staff:  Circulator: Lida Huntley RN  Surgical Assistant: Linda Cadena  Scrub Person First: Noy Alvarado  Scrub Person Second: Margaret Oden      * Missing procedure start or end time(s) *    Anesthesia:  GENERAL WITH INTERSCALENE BLOCK    Estimated Blood Loss: 30 CC. Complications: NONE    Implants:   Implant Name Type Inv. Item Serial No.  Lot No. LRB No. Used Action   ANCHOR SUTURE SINGLE 4.75 MM PEEK KNOTLESS SYS OMEGA - [id number]  ANCHOR SUTURE SINGLE 4.75 MM PEEK KNOTLESS SYS OMEGA  [employer] [id number] Left 2 Implanted   ANCHOR SUT DIA5. 5MM PEEK ZIP W/ NDL - [id number]  ANCHOR SUT DIA5. 5MM PEEK ZIP W/ NDL  [employer] [id number] Left 3 Implanted   ANCHOR SUT DIA5. 5MM PEEK ZIP W/ NDL - [id number]  ANCHOR SUT DIA5. 5MM PEEK ZIP W/ NDL  [employer] [id number] Left 1 Implanted   ANCHOR SUT DIA2.3MM W/ NDL 2 STRND NO2 FOR FBR - [id number]  ANCHOR SUT DIA2.3MM W/ NDL 2 STRND NO2 FORC FBR  [employer] [id number] Left 1 Implanted       Meche Hassan MD

## 2022-11-18 NOTE — ANESTHESIA PROCEDURE NOTES
Peripheral Block    Patient location during procedure: procedure area  Reason for block: post-op pain management and at surgeon's request  Start time: 11/18/2022 11:23 AM  End time: 11/18/2022 11:27 AM  Staffing  Performed: anesthesiologist   Anesthesiologist: Miri Hannah MD  Preanesthetic Checklist  Completed: patient identified, IV checked, site marked, risks and benefits discussed, surgical/procedural consents, equipment checked, pre-op evaluation, timeout performed, anesthesia consent given, oxygen available, monitors applied/VS acknowledged, fire risk safety assessment completed and verbalized and blood product R/B/A discussed and consented  Peripheral Block   Patient position: sitting  Prep: ChloraPrep  Provider prep: mask and sterile gloves  Patient monitoring: continuous pulse ox, cardiac monitor, frequent blood pressure checks, IV access, oxygen and responsive to questions  Block type: Brachial plexus  Interscalene  Laterality: left  Injection technique: single-shot  Guidance: nerve stimulator and ultrasound guided    Needle   Needle type: insulated echogenic nerve stimulator needle   Needle gauge: 20 G  Needle localization: nerve stimulator and ultrasound guidance  Needle insertion depth: 2 cm  Needle length: 5 cm  Assessment   Injection assessment: negative aspiration for heme, no paresthesia on injection, local visualized surrounding nerve on ultrasound and no intravascular symptoms  Paresthesia pain: none  Slow fractionated injection: yes  Hemodynamics: stable  Real-time US image taken/store: yes  Outcomes: uncomplicated    Additional Notes  Potential access sites were examined with ultrasound and the acceptable patent access site was selected (site noted above). The needle path and vein access were visualized in real time using ultrasonography, and an image was recorded for permanent record.      15 cc 1% Ropivacaine + 15 cc 1.5% Mepivacaine with 4 mg decadron and epi  Medications Administered  mepivacaine 1.5 % - Perineural   15 mL - 11/18/2022 11:23:00 AM  dexamethasone 4 MG/ML - Perineural   4 mg - 11/18/2022 11:23:00 AM

## 2022-11-18 NOTE — DISCHARGE INSTRUCTIONS
INSTRUCTIONS FOLLOWING ARTHROSCOPY SURGERY  Dr. Shannen Burgess [phone]    ACTIVITY   As tolerated and as directed by your doctor   Elevate surgery site first 48 hours. Use arm sling or crutches per your doctor's instructions. Bathe or shower as directed by your doctor. DIET   Clear liquids until no nausea or vomiting; then light diet for the first day   Advance to regular diet on second day, unless your doctor orders otherwise. If nausea and vomiting continues, call your doctor. PAIN   Take pain medication as directed by your doctor. Call your doctor if pain is NOT relieved by medication. DO NOT take aspirin or blood thinners until directed by your doctor. DRESSING CARE: Follow all dressing care instructions provided by Dr. Sofía Covarrubias from Dr Malik Krishna office will call tomorrow with further instructions. If you have any problems or concerns, call your doctor as needed. CALL YOUR DOCTOR IF   Excessive bleeding that does not stop after holding mild pressure over the area   Temperature of 101°F or above   Redness, excessive swelling or bruising, and/or green or yellow, smelly discharge from incision    AFTER ANESTHESIA   For the next 24 hours: DO NOT Drive, Drink alcoholic beverages, or Make important decisions. Be aware of dizziness following anesthesia and while taking pain medication.             Cryo Cuff or Iceman 24-48 hours continuously

## 2022-11-18 NOTE — PERIOP NOTE
"Requested Prescriptions   Pending Prescriptions Disp Refills     sertraline (ZOLOFT) 50 MG tablet [Pharmacy Med Name: SERTRALINE 50MG TABLETS] 30 tablet 0     Sig: TAKE 1 TABLET(50 MG) BY MOUTH DAILY  Last Written Prescription Date:  3/25/19  Last Fill Quantity: 30,  # refills: 0   Last office visit: 12/31/2018 with prescribing provider:  Michael   Future Office Visit:           SSRIs Protocol Failed - 6/30/2019  3:35 AM        Failed - Recent (6 mo) or future (30 days) visit within the authorizing provider's specialty     Patient had office visit in the last 6 months or has a visit in the next 30 days with authorizing provider or within the authorizing provider's specialty.  See \"Patient Info\" tab in inbasket, or \"Choose Columns\" in Meds & Orders section of the refill encounter.            Passed - PHQ-9 score less than 5 in past 6 months     Please review last PHQ-9 score.  PHQ-9 SCORE 12/4/2018 3/25/2019   PHQ-9 Total Score 16 1                Passed - Medication is active on med list        Passed - Patient is age 18 or older        Passed - No active pregnancy on record        Passed - No positive pregnancy test in last 12 months          " Patient states that her chest discomfort has resolved.

## 2022-11-18 NOTE — ANESTHESIA POSTPROCEDURE EVALUATION
Department of Anesthesiology  Postprocedure Note    Patient: Gil Odell  MRN: [id number]  YOB: 1960  Date of evaluation: 11/18/2022      Procedure Summary     Date: 11/18/22 Room / Location: [facility]  / [facility]    Anesthesia Start: 1479 Anesthesia Stop: 7404    Procedure: ARTHROSCOPY LEFT SHOULDER ARTHROSCOPIC SUBACROMIAL DECOMPRESSION, DISTAL CLAVICLE RESECTION, EXTENSIVE DEBRIDEMENT SLAP TEAR, BICEPS LABRAL COMPLEX, GLENOHUMERAL JOINT, SUBACROMIAL SPACE, MINI OPEN ROTATOR CUFF REPAIR, BICEPS TENODESIS  (Left: Shoulder) Diagnosis:       Nontraumatic complete tear of left rotator cuff      Bicipital tendinitis of left shoulder      Degenerative joint disease of left acromioclavicular joint      (Nontraumatic complete tear of left rotator cuff [M75.122])      (Bicipital tendinitis of left shoulder [M75.22])      (Degenerative joint disease of left acromioclavicular joint [M19.012])    Surgeons: Trena Vasquez MD Responsible Provider: Chucky Estrada MD    Anesthesia Type: general ASA Status: 3          Anesthesia Type: No value filed.     Gama Phase I: Gama Score: 7    Gama Phase II:        Anesthesia Post Evaluation    Patient location during evaluation: PACU  Patient participation: complete - patient participated  Level of consciousness: awake  Airway patency: patent  Nausea & Vomiting: no nausea  Complications: no  Cardiovascular status: blood pressure returned to baseline and hemodynamically stable  Respiratory status: acceptable  Hydration status: stable  Multimodal analgesia pain management approach

## 2022-11-19 NOTE — OP NOTE
[place]  OPERATIVE REPORT    Name:  Gustavo Corbett  MR#:  [id number]  :  1960  ACCOUNT #:  [de-identified]  DATE OF SERVICE:  2022    PREOPERATIVE DIAGNOSES:  1. Rotator cuff tear, left shoulder. 2.  Biceps tendinitis, left shoulder. 3.  Acromioclavicular joint arthritis, left shoulder. POSTOPERATIVE DIAGNOSES:  1. Rotator cuff tear, left shoulder. 2.  Biceps tendinitis, left shoulder. 3.  Acromioclavicular joint arthritis, left shoulder. 4.  Superior labrum anterior posterior tear, left shoulder. PROCEDURE PERFORMED:  Arthroscopy of left shoulder; arthroscopic subacromial decompression; arthroscopic distal clavicle resection; extensive debridement of SLAP tear, biceps labral complex, glenohumeral joint, and subacromial space; mini-open rotator cuff repair; and biceps tenodesis. SURGEON:  Concha Camejo. Delilah Beck MD      ANESTHESIA:  General with an interscalene block. COMPLICATIONS:  None. IMPLANTS:  Hardware utilized were four [employer] 5.5 anchors, one Iconix 2.3 anchor and one Omega double-double. ESTIMATED BLOOD LOSS:  30 mL. PATHOLOGY:  1. Type 2 acromion. 2.  AC joint arthritis. 3.  Type 2 SLAP tear. 4.  Biceps tendinitis. 5.  A 3 cm complex full-thickness supraspinatus and infraspinatus rotator cuff tear. CPT CODES:  06557, [id number], [id number], [id number]. ICD-10 CODES:  C28.621, M75.22, [id number], M19.012. INDICATIONS:  The patient is a 61-year-old female, who has developed recalcitrant left shoulder pain. Preoperative physical exam, radiographs, and an MRI demonstrated a type 2 acromion, degenerative joint disease in the [facility] [place] operated by [facility] joint, a full-thickness rotator cuff tear, biceps tendinitis. The patient has exhausted nonoperative modalities and electively admitted for operative intervention. PROCEDURE:  Following identification of the patient, the patient was taken to the operative suite.   Following administration of general anesthesia, interscalene block for postop pain control, 2 g of IV Ancef, the patient was positioned on the operating room table in the supine fashion. Left shoulder was examined under anesthesia and noted to be stable through full range of motion. At this point, the patient was carefully positioned in the lateral decubitus position, right side up. Axillary roll was placed. Beanbag was inflated. Care was taken to pad both dependent lower and upper extremities. The left arm was then placed in the "[employer] traction device in 15 pounds of traction. Left shoulder was then prepped and draped in sterile fashion. The subacromial space was then injected with 10 mL of 1% Xylocaine with epinephrine. Scope was introduced into the shoulder. Diagnostic arthroscopy was then commenced. Articular surfaces of the humeral head and glenoid were visualized and noted to be intact. Anterior, posterior, superior, and inferior labrum were visualized. There was a type 1 SLAP tear superiorly in conjunction with evidence of biceps tendinopathy. There was a full-thickness 3 cm supra and infraspinatus rotator cuff tear. Subscapularis was intact. Teres minor was intact. Scope was then flip-flopped from the posterior to the anterior portal.  Posterior cuff and labrum were visualized. Posterior cuff was intact. Scope was then introduced into the subacromial space. Lateral portal was then established. Hypertrophic hemorrhagic bursal tissue was then resected. The bursal side of the cuff was visualized and full-thickness 3 cm cuff tear was confirmed. With the use of an [employer] wand and acromionizing jason, an arthroscopic subacromial decompression was then performed. This was taken down to the level of the deltoid fascia anteriorly, AC joint posteriorly, and contoured from medial to lateral.  Once this was complete, our attention was then turned to resecting the distal clavicle. The distal 10 mm and 10 mm of the distal clavicle was then resected.   Care was taken to preserve the posterior superior capsule. At this point, given the combination of pathology, it was elected to perform a mini-open approach. The lateral portal was extended to 3 cm. Deltoid split was carried up to the acromion. The biceps tendon was identified. It was dissected free. It was brought out to length, tagged, transected, and tenodesed utilizing one Max 5.5 anchor, one Iconix 2.3 anchor and oversewn using #2 Mersilene sutures. Once the biceps tenodesis was complete, our attention was then turned to rotator cuff. The rotator cuff tear was then completely mobilized. It was 3 cm. It was repaired utilizing three Max 5.5 anchors medially and Omega double-double laterally. All limbs of all sutures were passed and secured. This yielded an excellent cuff repair, which easily brought the tissue back to the greater tuberosity. Scope was then placed back in the glenohumeral joint. Stump of the biceps and SLAP tear were debrided. Biceps labral complex were debrided. Undersurface of the rotator cuff was visualized. Anatomic footprint was re-established. Scope was then placed back on the bursal side. Bursal side of the cuff repair was stable. Biceps tenodesis was stable. At this point, with the procedure complete, arthroscopic equipment was removed from the shoulder. The portals were approximated using 2-0 nylon horizontal mattress sutures. The lateral wound was closed with 0 Vicryl figure-of-eight sutures, and a 2-0 Prolene subcuticular stitch. A sterile dressing was applied. Sling and swathe was applied. The patient was transferred to the recovery room in stable condition.         MD TARSHA Villar/[email or web address]  D:  11/18/2022 14:56  T:  11/19/2022 1:22  JOB #:  [id number]  CC:  Art Escoto MD

## 2022-11-28 ENCOUNTER — HOSPITAL ENCOUNTER (EMERGENCY)
Age: 62
Discharge: HOME | End: 2022-11-29
Attending: EMERGENCY MEDICINE
Payer: MEDICARE

## 2022-11-28 ENCOUNTER — TELEPHONE (OUTPATIENT)
Dept: ORTHOPEDIC SURGERY | Age: 62
End: 2022-11-28

## 2022-11-28 DIAGNOSIS — M79.89 LEG SWELLING: Primary | ICD-10-CM

## 2022-11-28 LAB
ALBUMIN SERPL-MCNC: 2.9 G/DL (ref 3.2–4.6)
ALBUMIN/GLOB SERPL: 0.9 {RATIO} (ref 0.4–1.6)
ALP SERPL-CCNC: 114 U/L (ref 50–136)
ALT SERPL-CCNC: 29 U/L (ref 12–65)
ANION GAP SERPL CALC-SCNC: 4 MMOL/L (ref 2–11)
AST SERPL-CCNC: 27 U/L (ref 15–37)
BASOPHILS # BLD: 0 K/UL (ref 0–0.2)
BASOPHILS NFR BLD: 1 % (ref 0–2)
BILIRUB SERPL-MCNC: 0.2 MG/DL (ref 0.2–1.1)
BUN SERPL-MCNC: 21 MG/DL (ref 8–23)
CALCIUM SERPL-MCNC: 8.7 MG/DL (ref 8.3–10.4)
CHLORIDE SERPL-SCNC: 110 MMOL/L (ref 101–110)
CO2 SERPL-SCNC: 26 MMOL/L (ref 21–32)
CREAT SERPL-MCNC: 0.9 MG/DL (ref 0.6–1)
DIFFERENTIAL METHOD BLD: ABNORMAL
EOSINOPHIL # BLD: 0.1 K/UL (ref 0–0.8)
EOSINOPHIL NFR BLD: 3 % (ref 0.5–7.8)
ERYTHROCYTE [DISTWIDTH] IN BLOOD BY AUTOMATED COUNT: 14.4 % (ref 11.9–14.6)
GLOBULIN SER CALC-MCNC: 3.4 G/DL (ref 2.8–4.5)
GLUCOSE SERPL-MCNC: 103 MG/DL (ref 65–100)
HCT VFR BLD AUTO: 32.9 % (ref 35.8–46.3)
HGB BLD-MCNC: 10.6 G/DL (ref 11.7–15.4)
IMM GRANULOCYTES # BLD AUTO: 0 K/UL (ref 0–0.5)
IMM GRANULOCYTES NFR BLD AUTO: 0 % (ref 0–5)
LYMPHOCYTES # BLD: 2.2 K/UL (ref 0.5–4.6)
LYMPHOCYTES NFR BLD: 45 % (ref 13–44)
MCH RBC QN AUTO: 30.5 PG (ref 26.1–32.9)
MCHC RBC AUTO-ENTMCNC: 32.2 G/DL (ref 31.4–35)
MCV RBC AUTO: 94.5 FL (ref 82–102)
MONOCYTES # BLD: 0.6 K/UL (ref 0.1–1.3)
MONOCYTES NFR BLD: 12 % (ref 4–12)
NEUTS SEG # BLD: 1.9 K/UL (ref 1.7–8.2)
NEUTS SEG NFR BLD: 39 % (ref 43–78)
NRBC # BLD: 0 K/UL (ref 0–0.2)
PLATELET # BLD AUTO: 268 K/UL (ref 150–450)
PMV BLD AUTO: 10.2 FL (ref 9.4–12.3)
POTASSIUM SERPL-SCNC: 4.5 MMOL/L (ref 3.5–5.1)
PROT SERPL-MCNC: 6.3 G/DL (ref 6.3–8.2)
RBC # BLD AUTO: 3.48 M/UL (ref 4.05–5.2)
SODIUM SERPL-SCNC: 140 MMOL/L (ref 133–143)
WBC # BLD AUTO: 4.9 K/UL (ref 4.3–11.1)

## 2022-11-28 PROCEDURE — 83880 ASSAY OF NATRIURETIC PEPTIDE: CPT

## 2022-11-28 PROCEDURE — 96375 TX/PRO/DX INJ NEW DRUG ADDON: CPT

## 2022-11-28 PROCEDURE — 85025 COMPLETE CBC W/AUTO DIFF WBC: CPT

## 2022-11-28 PROCEDURE — 96374 THER/PROPH/DIAG INJ IV PUSH: CPT

## 2022-11-28 PROCEDURE — 99284 EMERGENCY DEPT VISIT MOD MDM: CPT

## 2022-11-28 PROCEDURE — 80053 COMPREHEN METABOLIC PANEL: CPT

## 2022-11-28 ASSESSMENT — PAIN - FUNCTIONAL ASSESSMENT: PAIN_FUNCTIONAL_ASSESSMENT: 0-10

## 2022-11-28 ASSESSMENT — PAIN SCALES - GENERAL: PAINLEVEL_OUTOF10: 6

## 2022-11-29 ENCOUNTER — HOSPITAL ENCOUNTER (EMERGENCY)
Dept: ULTRASOUND IMAGING | Age: 62
Discharge: HOME | End: 2022-12-02
Payer: MEDICARE

## 2022-11-29 VITALS
HEART RATE: 50 BPM | SYSTOLIC BLOOD PRESSURE: 137 MMHG | RESPIRATION RATE: 15 BRPM | OXYGEN SATURATION: 100 % | TEMPERATURE: 97.6 F | DIASTOLIC BLOOD PRESSURE: 67 MMHG | BODY MASS INDEX: 42.48 KG/M2 | HEIGHT: 61 IN | WEIGHT: 225 LBS

## 2022-11-29 LAB — NT PRO BNP: 218 PG/ML (ref 5–125)

## 2022-11-29 PROCEDURE — 96374 THER/PROPH/DIAG INJ IV PUSH: CPT

## 2022-11-29 PROCEDURE — 96375 TX/PRO/DX INJ NEW DRUG ADDON: CPT

## 2022-11-29 PROCEDURE — 6360000002 HC RX W HCPCS: Performed by: EMERGENCY MEDICINE

## 2022-11-29 PROCEDURE — 93970 EXTREMITY STUDY: CPT

## 2022-11-29 RX ORDER — ONDANSETRON 2 MG/ML
4 INJECTION INTRAMUSCULAR; INTRAVENOUS
Status: COMPLETED | OUTPATIENT
Start: 2022-11-29 | End: 2022-11-29

## 2022-11-29 RX ORDER — FUROSEMIDE 20 MG/1
20 TABLET ORAL DAILY
Qty: 5 TABLET | Refills: 0 | Status: SHIPPED | OUTPATIENT
Start: 2022-11-29 | End: 2022-12-04

## 2022-11-29 RX ORDER — HYDROMORPHONE HYDROCHLORIDE 1 MG/ML
0.5 INJECTION, SOLUTION INTRAMUSCULAR; INTRAVENOUS; SUBCUTANEOUS
Status: COMPLETED | OUTPATIENT
Start: 2022-11-29 | End: 2022-11-29

## 2022-11-29 RX ADMIN — HYDROMORPHONE HYDROCHLORIDE 0.5 MG: 1 INJECTION, SOLUTION INTRAMUSCULAR; INTRAVENOUS; SUBCUTANEOUS at 00:32

## 2022-11-29 RX ADMIN — ONDANSETRON 4 MG: 2 INJECTION INTRAMUSCULAR; INTRAVENOUS at 00:32

## 2022-11-29 ASSESSMENT — ENCOUNTER SYMPTOMS
NAUSEA: 0
ABDOMINAL PAIN: 0
SHORTNESS OF BREATH: 0
VOMITING: 0

## 2022-11-29 ASSESSMENT — PAIN SCALES - GENERAL
PAINLEVEL_OUTOF10: 5
PAINLEVEL_OUTOF10: 5

## 2022-11-29 ASSESSMENT — PAIN DESCRIPTION - LOCATION
LOCATION: LEG
LOCATION: LEG

## 2022-11-29 ASSESSMENT — PAIN DESCRIPTION - ORIENTATION: ORIENTATION: RIGHT;LEFT

## 2022-11-29 ASSESSMENT — PAIN - FUNCTIONAL ASSESSMENT: PAIN_FUNCTIONAL_ASSESSMENT: 0-10

## 2022-11-29 NOTE — ED PROVIDER NOTES
Emergency Department Provider Note                   PCP:                HAMIDA Andrade NP               Age: 65 y.o. Sex: female       ICD-10-CM    1. Leg swelling  M79.89           DISPOSITION Decision To Discharge 11/29/2022 03:52:45 AM        MDM  Number of Diagnoses or Management Options  Leg swelling: new, needed workup     Amount and/or Complexity of Data Reviewed  Clinical lab tests: ordered and reviewed  Tests in the radiology section of CPT®: ordered and reviewed  Review and summarize past medical records: yes    Risk of Complications, Morbidity, and/or Mortality  Presenting problems: moderate  Diagnostic procedures: moderate  Management options: moderate    Patient Progress  Patient progress: stable             Orders Placed This Encounter   Procedures    CBC with Auto Differential    Comprehensive Metabolic Panel    Brain Natriuretic Peptide    Vascular duplex lower extremity venous bilateral        Medications   HYDROmorphone HCl PF (DILAUDID) injection 0.5 mg (0.5 mg IntraVENous Given 11/29/22 0032)   ondansetron (ZOFRAN) injection 4 mg (4 mg IntraVENous Given 11/29/22 0032)       New Prescriptions    FUROSEMIDE (LASIX) 20 MG TABLET    Take 1 tablet by mouth daily for 5 days        Praneeth Macedo is a 65 y.o. female who presents to the Emergency Department with chief complaint of    Chief Complaint   Patient presents with    Leg Swelling      81-year-old black female status post left rotator cuff repair on the 15th of the month presents to the emergency department complaining of increased bilateral lower extremity swelling and discomfort over the last several days. Patient was thought she was supposed to follow-up with his orthopedist today, but it turns out it was not until at least next week. She was then redirected to the ER if she was having more problems.   She denies any chest pain or shortness of breath, history of trauma to the lower extremities, change in the color of the skin, fever or chills. Pain is worse with palpation. No history of DVT or significant swelling to lower extremities in the past.  She denies any PND orthopnea. The history is provided by the patient and the spouse. Review of Systems   Constitutional:  Negative for chills and fever. Respiratory:  Negative for shortness of breath. Cardiovascular:  Positive for leg swelling. Negative for chest pain and palpitations. Gastrointestinal:  Negative for abdominal pain, nausea and vomiting. Musculoskeletal:  Positive for arthralgias. Negative for neck pain and neck stiffness. All other systems reviewed and are negative.     Past Medical History:   Diagnosis Date    Abnormal Pap smear of cervix     Degenerative joint disease of left acromioclavicular joint 10/17/2022    Depression 10/22/2015    Gastrointestinal disorder     GERD    Other ill-defined conditions(799.89)     anemic    Prolonged emergence from general anesthesia         Past Surgical History:   Procedure Laterality Date    CHOLECYSTECTOMY      COLONOSCOPY      due again in [facility]  2005    [facility]    ORTHOPEDIC SURGERY      right ankle    OTHER SURGICAL HISTORY      gastric bypass    SHOULDER ARTHROSCOPY Left 11/18/2022    ARTHROSCOPY LEFT SHOULDER ARTHROSCOPIC SUBACROMIAL DECOMPRESSION, DISTAL CLAVICLE RESECTION, EXTENSIVE DEBRIDEMENT SLAP TEAR, BICEPS LABRAL COMPLEX, GLENOHUMERAL JOINT, SUBACROMIAL SPACE, MINI OPEN ROTATOR CUFF REPAIR, BICEPS TENODESIS  performed by Gloria Prakash MD at [facility]          Family History   Problem Relation Age of Onset    Hypertension Mother     Diabetes Mother     Heart Disease Mother     Hypertension Father     Cancer Father         brain        Social History     Socioeconomic History    Marital status: Single     Spouse name: None    Number of children: None    Years of education: None    Highest education level: None Tobacco Use    Smoking status: Never     Passive exposure: Never    Smokeless tobacco: Never   Vaping Use    Vaping Use: Never used   Substance and Sexual Activity    Alcohol use: Yes     Comment: occasionally    Drug use: No         Oxycodone, Promethazine, Sulfur, and Sulfa antibiotics     Previous Medications    ACETAMINOPHEN (TYLENOL ARTHRITIS PAIN PO)    Take by mouth as needed    FLUOXETINE (PROZAC) 20 MG CAPSULE    in the morning and at bedtime    GABAPENTIN (NEURONTIN) 100 MG CAPSULE    Take 1 capsule by mouth 3 times daily for 30 days. HYDROCODONE-ACETAMINOPHEN (NORCO)  MG PER TABLET    Take by mouth. KETOROLAC (TORADOL) 10 MG TABLET    Take 1 tablet by mouth every 6 hours for 5 days post-op    LUBIPROSTONE (AMITIZA) 24 MCG CAPSULE    Take 24 mcg by mouth 2 times daily (with meals) TAKE 1 CAPSULE BY MOUTH TWO (2) TIMES DAILY (WITH MEALS). METHOCARBAMOL (ROBAXIN) 750 MG TABLET    2 times daily as needed    MULTIPLE VITAMIN (MULTIVITAMIN ADULT PO)    Take by mouth daily    OMEPRAZOLE (PRILOSEC) 40 MG DELAYED RELEASE CAPSULE    TAKE 1 CAPSULE BY MOUTH EVERY DAY    ONDANSETRON (ZOFRAN) 4 MG TABLET    Take 1 tablet by mouth every 8 hours as needed for Nausea or Vomiting    ONDANSETRON (ZOFRAN-ODT) 4 MG DISINTEGRATING TABLET    TAKE 1 TABLET BY MOUTH EVERY 8 HOURS AS NEEDED FOR NAUSEA    PHENTERMINE (ADIPEX-P) 37.5 MG TABLET    TAKE 0.5 TABLETS BY MOUTH TWICE A DAY    TEMAZEPAM (RESTORIL) 30 MG CAPSULE    nightly. TIZANIDINE (ZANAFLEX) 4 MG TABLET    Take 4 mg by mouth 2 times daily    TOPIRAMATE (TOPAMAX) 25 MG TABLET    25 mg in the morning and at bedtime        Vitals signs and nursing note reviewed. Patient Vitals for the past 4 hrs:   Pulse Resp BP SpO2   11/29/22 0037 62 16 (!) 150/63 99 %          Physical Exam  Vitals and nursing note reviewed. Constitutional:       General: She is not in acute distress. HENT:      Head: Normocephalic and atraumatic.       Right Ear: External ear normal.      Left Ear: External ear normal.      Nose: Nose normal.      Mouth/Throat:      Mouth: Mucous membranes are moist.   Eyes:      Extraocular Movements: Extraocular movements intact. Conjunctiva/sclera: Conjunctivae normal.      Pupils: Pupils are equal, round, and reactive to light. Cardiovascular:      Rate and Rhythm: Normal rate and regular rhythm. Pulses: Normal pulses. Heart sounds: No murmur heard. Pulmonary:      Effort: Pulmonary effort is normal.      Breath sounds: Normal breath sounds. Abdominal:      General: Abdomen is flat. Palpations: There is no mass. Tenderness: There is no abdominal tenderness. There is no right CVA tenderness or left CVA tenderness. Musculoskeletal:         General: Tenderness (Mostly the medial aspect of thighs bilaterally right leg greater than left.) present. Normal range of motion. Cervical back: Normal range of motion and neck supple. Right lower leg: Edema present. Left lower leg: Edema present. Comments: Postsurgical bandage to the left shoulder and the patient's left shoulder is in a sling. Skin:     General: Skin is warm and dry. Neurological:      General: No focal deficit present. Mental Status: She is alert and oriented to person, place, and time. Psychiatric:         Mood and Affect: Mood and affect normal.         Speech: Speech normal.        Procedures    The patient was observed in the ED. patient gives a history of having to elevate the head of the bed and upper torso since the surgery of her shoulder, and I suspect this is a lot of the reason for the swelling in the lower extremities as ultrasound is negative. She gives no history of chest pain or shortness of breath and her BNP is minimally elevated, so we will attempt to alleviate the discomfort in her lower extremities with a short course of Lasix.   Patient is instructed to follow-up with her family doctor in the next 2 to 3 days for recheck, sooner if worse. Results Reviewed:      Recent Results (from the past 24 hour(s))   CBC with Auto Differential    Collection Time: 11/28/22 10:05 PM   Result Value Ref Range    WBC 4.9 4.3 - 11.1 K/uL    RBC 3.48 (L) 4.05 - 5.2 M/uL    Hemoglobin 10.6 (L) 11.7 - 15.4 g/dL    Hematocrit 32.9 (L) 35.8 - 46.3 %    MCV 94.5 82.0 - 102.0 FL    MCH 30.5 26.1 - 32.9 PG    MCHC 32.2 31.4 - 35.0 g/dL    RDW 14.4 11.9 - 14.6 %    Platelets 405 296 - 652 K/uL    MPV 10.2 9.4 - 12.3 FL    nRBC 0.00 0.0 - 0.2 K/uL    Differential Type AUTOMATED      Seg Neutrophils 39 (L) 43 - 78 %    Lymphocytes 45 (H) 13 - 44 %    Monocytes 12 4.0 - 12.0 %    Eosinophils % 3 0.5 - 7.8 %    Basophils 1 0.0 - 2.0 %    Immature Granulocytes 0 0.0 - 5.0 %    Segs Absolute 1.9 1.7 - 8.2 K/UL    Absolute Lymph # 2.2 0.5 - 4.6 K/UL    Absolute Mono # 0.6 0.1 - 1.3 K/UL    Absolute Eos # 0.1 0.0 - 0.8 K/UL    Basophils Absolute 0.0 0.0 - 0.2 K/UL    Absolute Immature Granulocyte 0.0 0.0 - 0.5 K/UL   Comprehensive Metabolic Panel    Collection Time: 11/28/22 10:05 PM   Result Value Ref Range    Sodium 140 133 - 143 mmol/L    Potassium 4.5 3.5 - 5.1 mmol/L    Chloride 110 101 - 110 mmol/L    CO2 26 21 - 32 mmol/L    Anion Gap 4 2 - 11 mmol/L    Glucose 103 (H) 65 - 100 mg/dL    BUN 21 8 - 23 MG/DL    Creatinine 0.90 0.6 - 1.0 MG/DL    Est, Glom Filt Rate >60 >60 ml/min/1.73m2    Calcium 8.7 8.3 - 10.4 MG/DL    Total Bilirubin 0.2 0.2 - 1.1 MG/DL    ALT 29 12 - 65 U/L    AST 27 15 - 37 U/L    Alk Phosphatase 114 50 - 136 U/L    Total Protein 6.3 6.3 - 8.2 g/dL    Albumin 2.9 (L) 3.2 - 4.6 g/dL    Globulin 3.4 2.8 - 4.5 g/dL    Albumin/Globulin Ratio 0.9 0.4 - 1.6     Brain Natriuretic Peptide    Collection Time: 11/28/22 10:05 PM   Result Value Ref Range    NT Pro- (H) 5 - 125 PG/ML     Vascular duplex lower extremity venous bilateral   Final Result      1. No evidence of deep vein thrombosis.                I discussed the results of all labs, procedures, radiographs, and treatments with the patient and available family. Treatment plan is agreed upon and the patient is ready for discharge. All voiced understanding of the discharge plan and medication instructions or changes as appropriate. Questions about treatment in the ED were answered. All were encouraged to return should symptoms worsen or new problems develop. Voice dictation software was used during the making of this note. This software is not perfect and grammatical and other typographical errors may be present. This note has not been completely proofread for errors.      Suzanna Rosenbaum MD  11/29/22 [address], [place]  11/29/22 1759

## 2022-11-29 NOTE — ED NOTES
I have reviewed discharge instructions with the patient. The patient verbalized understanding. Patient left ED via Discharge Method: wheelchair to home with self and son. Opportunity for questions and clarification provided. Patient given 1 scripts. To continue your aftercare when you leave the hospital, you may receive an automated call from our care team to check in on how you are doing. This is a free service and part of our promise to provide the best care and service to meet your aftercare needs.  If you have questions, or wish to unsubscribe from this service please call [phone]. Thank you for Choosing our [facility] Emergency Department.         Brianna Chacon RN  11/29/22 9021

## 2022-11-30 ENCOUNTER — OFFICE VISIT (OUTPATIENT)
Dept: ORTHOPEDIC SURGERY | Age: 62
End: 2022-11-30

## 2022-11-30 DIAGNOSIS — Z48.89 ENCOUNTER FOR POSTOPERATIVE CARE: Primary | ICD-10-CM

## 2022-11-30 PROCEDURE — 99024 POSTOP FOLLOW-UP VISIT: CPT | Performed by: ORTHOPAEDIC SURGERY

## 2022-11-30 RX ORDER — HYDROMORPHONE HYDROCHLORIDE 2 MG/1
2 TABLET ORAL EVERY 6 HOURS PRN
Qty: 40 TABLET | Refills: 0 | Status: SHIPPED | OUTPATIENT
Start: 2022-11-30 | End: 2022-12-10

## 2022-11-30 NOTE — PROGRESS NOTES
Name: Johnnie Odom  YOB: 1960  Gender: female  MRN: [id number]          HPI: Johnnie Odom is a 65 y.o. left-hand-dominant female 2 weeks status post arthroscopy left shoulder ASD, ADCR, extensive debridement SLAP tear, biceps labral complex, glenohumeral joint, subacromial space, mini open rotator cuff repair and biceps tenodesis. Operative findings are notable for a 3 cm rotator cuff tear. She returns noting that postop she developed lower extremity swelling. She contacted our answering service on Monday twice. She got no response. Ultimately went to the ER. Ultrasound was negative. She was put on a diuretic. She is put on 25 pounds since surgery and has lost 7. Otherwise doing well. ROS/Meds/PSH/PMH/FH/SH: A ten system review of systems was performed and is negative other than what is in the HPI. Tobacco:  reports that she has never smoked. She has never been exposed to tobacco smoke. She has never used smokeless tobacco.  There were no vitals taken for this visit. Physical Examination:  She is an awake alert female ambulating without difficulty    The right shoulder has 0 to 180 degrees of active and 0 to 180 degrees passive forward elevation. Internal rotation is to T6. External rotation is to 60 degrees at the side. In the 90 degree abducted position 90 degrees of external and 90 degrees internal rotation  The AC joint is non-tender  SC joint is non-tender. Greater tuberosity is non-tender. negative biceps  Negative O'Briens sign  negative lift-off sign  Negative belly press sign  Negative bear huggers sign  negative drop sign  negative hornblower's sign  No posterior glenohumeral joint line tenderness. No evident excessive external rotation  Rotator cuff strength is 5/5.  negative external rotation stress test.   Negative empty can sign  There is no evident anterior or posterior apprehension with a negative sulcus sign.    No instability  negative external and internal Rotation lag sign  Neurovascularly intact. The left shoulder surgical incisions have healed  Sutures were removed  Passive forward elevation 0-90  ER to 30  Biceps has good cosmetic appearance  She is neurovascularly intact    Data Reviewed:      Duplex Doppler left lower extremity was negative for DVT        Impression:   1. Encounter for postoperative care       status post arthroscopy left shoulder ASD, ADCR, extensive debridement SLAP tear, biceps labral complex, glenohumeral joint, subacromial space, mini open rotator cuff repair and biceps tenodesis 2 weeks  Chronic pain on Norco 10 mg 3 times a day  Overweight 5 foot 1 209 pounds BMI 39 status post gastric bypass    Plan:   I discussed the with the patient. We removed her sutures. I refilled her Dilaudid. We will start her in outpatient physical therapy working on passive motion. I have advised her to contact Dr. Parris Mcdowell her primary care doctor regarding her fluid status. I will recheck her back in 3 weeks    Follow up: Return in about 3 weeks (around 12/21/2022).              Benita Contreras MD

## 2022-12-15 ENCOUNTER — TELEPHONE (OUTPATIENT)
Dept: ORTHOPEDIC SURGERY | Age: 62
End: 2022-12-15

## 2022-12-15 DIAGNOSIS — Z48.89 ENCOUNTER FOR POSTOPERATIVE CARE: Primary | ICD-10-CM

## 2022-12-15 RX ORDER — HYDROMORPHONE HYDROCHLORIDE 2 MG/1
2 TABLET ORAL EVERY 6 HOURS PRN
Qty: 40 TABLET | Refills: 0 | Status: SHIPPED | OUTPATIENT
Start: 2022-12-15 | End: 2022-12-25

## 2022-12-16 ENCOUNTER — TELEPHONE (OUTPATIENT)
Dept: ORTHOPEDIC SURGERY | Age: 62
End: 2022-12-16

## 2022-12-16 NOTE — TELEPHONE ENCOUNTER
Called and spoke to pt who caught herself while slipping off the bed with her left elbow/arm. Pt states her shoulder has been having increased pain since then. Pt has appt on Monday of next week and will discuss with Dr. Naun Cervantes then. Informed pt that rx was sent yesterday. Pt agreed.

## 2022-12-16 NOTE — TELEPHONE ENCOUNTER
Called and spoke to pt and informed her that Dr. Xiomara Mcgill is out of the office for the weekend and I cannot approve anything without speaking with him. Advised pt to treat pain with her Norco rx from pain management as she has been doing and issue can be addressed at appt on Monday.

## 2022-12-19 ENCOUNTER — OFFICE VISIT (OUTPATIENT)
Dept: ORTHOPEDIC SURGERY | Age: 62
End: 2022-12-19
Payer: MEDICARE

## 2022-12-19 ENCOUNTER — TELEPHONE (OUTPATIENT)
Dept: ORTHOPEDIC SURGERY | Age: 62
End: 2022-12-19

## 2022-12-19 DIAGNOSIS — S43.402A SPRAIN OF LEFT SHOULDER, UNSPECIFIED SHOULDER SPRAIN TYPE, INITIAL ENCOUNTER: Primary | ICD-10-CM

## 2022-12-19 DIAGNOSIS — S53.402A SPRAIN OF LEFT ELBOW, INITIAL ENCOUNTER: ICD-10-CM

## 2022-12-19 PROCEDURE — 1036F TOBACCO NON-USER: CPT | Performed by: ORTHOPAEDIC SURGERY

## 2022-12-19 PROCEDURE — 99214 OFFICE O/P EST MOD 30 MIN: CPT | Performed by: ORTHOPAEDIC SURGERY

## 2022-12-19 PROCEDURE — G8484 FLU IMMUNIZE NO ADMIN: HCPCS | Performed by: ORTHOPAEDIC SURGERY

## 2022-12-19 PROCEDURE — G8427 DOCREV CUR MEDS BY ELIG CLIN: HCPCS | Performed by: ORTHOPAEDIC SURGERY

## 2022-12-19 PROCEDURE — G8417 CALC BMI ABV UP PARAM F/U: HCPCS | Performed by: ORTHOPAEDIC SURGERY

## 2022-12-19 PROCEDURE — 3017F COLORECTAL CA SCREEN DOC REV: CPT | Performed by: ORTHOPAEDIC SURGERY

## 2022-12-19 NOTE — TELEPHONE ENCOUNTER
Having issues getting her pain meds filled because she was already on pain meds, please call [facility] on [address]

## 2022-12-19 NOTE — PROGRESS NOTES
Name: Rey Lockhart  YOB: 1960  Gender: female  MRN: [id number]    What: Left shoulder and left elbow pain  How: A fall  When: 10 days ago      HPI: Rey Lockhart is a 65 y.o. left-hand-dominant female seen for left elbow and left shoulder pain. She is 4.5 weeks status post arthroscopy left shoulder ASD, ADCR, extensive debridement SLAP tear, biceps labral complex, glenohumeral joint, subacromial space, mini open rotator cuff repair and biceps tenodesis. Operative findings are notable for a 3 cm rotator cuff tear. She fell 10 days ago injuring her left shoulder and left elbow. ROS/Meds/PSH/PMH/FH/SH: A ten system review of systems was performed and is negative other than what is in the HPI. Tobacco:  reports that she has never smoked. She has never been exposed to tobacco smoke. She has never used smokeless tobacco.  There were no vitals taken for this visit. Physical Examination:  She is an awake alert female ambulating without difficulty    The right shoulder has 0 to 180 degrees of active and 0 to 180 degrees passive forward elevation. Internal rotation is to T6. External rotation is to 60 degrees at the side. In the 90 degree abducted position 90 degrees of external and 90 degrees internal rotation  The AC joint is non-tender  SC joint is non-tender. Greater tuberosity is non-tender. negative biceps  Negative O'Briens sign  negative lift-off sign  Negative belly press sign  Negative bear huggers sign  negative drop sign  negative hornblower's sign  No posterior glenohumeral joint line tenderness. No evident excessive external rotation  Rotator cuff strength is 5/5.  negative external rotation stress test.   Negative empty can sign  There is no evident anterior or posterior apprehension with a negative sulcus sign. No instability  negative external and internal Rotation lag sign  Neurovascularly intact.     The right elbow has a range of motion of 0 to 135 with 85 degrees of supination and 75 degrees of pronation. Patient is non-tender over the medial epicondyle  non-tender over the ulnar nerve with no evidence of any subluxation or dislocation. Negative tinel at the cubital tunnel  Negative flexor pronator stress test.  Patient is non-tender over the radial tunnel  non-tender over the lateral epicondyle with a negative wrist extensor stress test.   The patient is non-tender when shaking hands. Negative middle finger extension stress test.  The biceps tendon is intact. Present hook test.  Negative reverse maribel sign  The right elbow is stable at 0, 30, 70, 90, degrees. No swelling or erythema over the olecranon bursa. Patient has 2+ radial and ulnar pulses and neurovascularly is intact. The left shoulder surgical incisions are intact  Passive forward elevation 0-90  ER to 30  Biceps has good cosmetic appearance  She is neurovascularly intact    The left elbow has a range of motion of 0 to 135 with 85 degrees of supination and 75 degrees of pronation. Patient is non-tender over the medial epicondyle  non-tender over the ulnar nerve with no evidence of any subluxation or dislocation. Negative tinel at the cubital tunnel  Negative flexor pronator stress test.  Patient is non-tender over the radial tunnel  non-tender over the lateral epicondyle with a negative wrist extensor stress test.   The patient is non-tender when shaking hands. Negative middle finger extension stress test.  The biceps tendon is intact. Present hook test.  Negative reverse maribel sign  The left elbow is stable at 0, 30, 70, 90, degrees. No swelling or erythema over the olecranon bursa. Patient has 2+ radial and ulnar pulses and neurovascularly is intact. Minimal left elbow discomfort      Data Reviewed:        XR: AP Y axillary views left shoulder  AP and lateral views left elbow   Clinical Indication    ICD-10-CM    1.  Sprain of left shoulder, unspecified shoulder sprain type, initial encounter  S43.402A XR SHOULDER LEFT (MIN 2 VIEWS)     XR ELBOW LEFT (2 VIEWS)     Amb External Referral To Physical Therapy      2. Sprain of left elbow, initial encounter  S53.402A          Report: AP Y axillary views left shoulder demonstrate a type I acromion. Evidence of a distal clavicle resection. No fracture. No dislocation. AP and lateral views left elbow demonstrate no fracture    Impression: As above   Jc Conklin MD            Impression:   1. Sprain of left shoulder, unspecified shoulder sprain type, initial encounter    2. Sprain of left elbow, initial encounter        Rule out internal derangement left shoulder  Rule out internal derangement left elbow  status post arthroscopy left shoulder ASD, ADCR, extensive debridement SLAP tear, biceps labral complex, glenohumeral joint, subacromial space, mini open rotator cuff repair and biceps tenodesis 4.5 weeks  Chronic pain on Norco 10 mg 3 times a day  Overweight 5 foot 1 209 pounds BMI 39 status post gastric bypass    Plan:   I discussed the problem with the patient. I discussed nonoperative versus operative intervention including injections. I discussed the potential for recurrent rotator cuff tear left shoulder. We will defer surgery and injections for now. We will observe her. Starting next week in physical therapy will work on full motion full-strength left shoulder. I will recheck her back in 1 month. If she is no better we may consider an MRI of the left shoulder. I will recheck her back in 1 month. She is currently in pain management    4. Acute complicated injury  Follow up: Return in about 1 month (around 1/19/2023).              Jc Conklin MD

## 2023-01-09 ENCOUNTER — TELEPHONE (OUTPATIENT)
Dept: ORTHOPEDIC SURGERY | Age: 63
End: 2023-01-09

## 2023-01-09 NOTE — TELEPHONE ENCOUNTER
She went to pain mgmt but they would not refill her meds because she is still having so much pain from her shoulder. She is wanting Dr. Shannen Burgess to fill her pain meds. [facility] [address] [facility].

## 2023-01-10 ENCOUNTER — TELEPHONE (OUTPATIENT)
Dept: ORTHOPEDIC SURGERY | Age: 63
End: 2023-01-10

## 2023-01-11 DIAGNOSIS — S43.402A SPRAIN OF LEFT SHOULDER, UNSPECIFIED SHOULDER SPRAIN TYPE, INITIAL ENCOUNTER: Primary | ICD-10-CM

## 2023-01-11 NOTE — TELEPHONE ENCOUNTER
Spoke with patient and told her that we needed something in writing from Dr. Dorina Williamson stating that he will not prescribe her pain meds for her shoulder. Also told her that Dr. Faviola Calix wants her to have an MRI of her shoulder to see if she did anything to it when she fell. Patient expressed understanding.

## 2023-01-12 ENCOUNTER — TELEPHONE (OUTPATIENT)
Dept: ORTHOPEDIC SURGERY | Age: 63
End: 2023-01-12

## 2023-01-13 DIAGNOSIS — Z48.89 ENCOUNTER FOR POSTOPERATIVE CARE: Primary | ICD-10-CM

## 2023-01-13 RX ORDER — HYDROMORPHONE HYDROCHLORIDE 2 MG/1
2 TABLET ORAL EVERY 6 HOURS PRN
Qty: 40 TABLET | Refills: 0 | Status: SHIPPED | OUTPATIENT
Start: 2023-01-13 | End: 2023-01-23

## 2023-01-13 NOTE — TELEPHONE ENCOUNTER
I called patient and told her that Dr. Chanell Hunt would be sending in a rx for her shoulder pain. I reiteratad that she can only get pain med from Dr. Chanell Hunt at this time and we would let her know when she could transition back to pain mgt. Patient expressed understanding. Patient also has an MRI scheduled for 1/23/23 and a follow up with Dr. Chanell Hunt has been scheduled for 1/24/23 to go over results. [facility].

## 2023-01-24 ENCOUNTER — OFFICE VISIT (OUTPATIENT)
Dept: ORTHOPEDIC SURGERY | Age: 63
End: 2023-01-24

## 2023-01-24 DIAGNOSIS — Z48.89 ENCOUNTER FOR POSTOPERATIVE CARE: Primary | ICD-10-CM

## 2023-01-24 PROCEDURE — 99024 POSTOP FOLLOW-UP VISIT: CPT | Performed by: ORTHOPAEDIC SURGERY

## 2023-01-24 RX ORDER — OXYCODONE HYDROCHLORIDE 10 MG/1
10 TABLET ORAL EVERY 6 HOURS PRN
Qty: 40 TABLET | Refills: 0 | Status: SHIPPED | OUTPATIENT
Start: 2023-01-24 | End: 2023-02-03

## 2023-01-24 RX ORDER — GABAPENTIN 100 MG/1
CAPSULE ORAL
Qty: 180 CAPSULE | Refills: 0 | Status: SHIPPED | OUTPATIENT
Start: 2023-01-24 | End: 2023-02-23

## 2023-01-24 NOTE — PROGRESS NOTES
Name: Nima Salazar  YOB: 1960  Gender: female  MRN: [id number]          HPI: Nima Salazar is a 65 y.o. left-hand-dominant female seen for left elbow and left shoulder pain. She is over 2 months status post arthroscopy left shoulder ASD, ADCR, extensive debridement SLAP tear, biceps labral complex, glenohumeral joint, subacromial space, mini open rotator cuff repair and biceps tenodesis. Operative findings are notable for a 3 cm rotator cuff tear. She fell postop reinjuring the left shoulder. She is having difficulty with pain control. She is on Norco 10 3 times daily from pain management. She does not like the Dilaudid      ROS/Meds/PSH/PMH/FH/SH: A ten system review of systems was performed and is negative other than what is in the HPI. Tobacco:  reports that she has never smoked. She has never been exposed to tobacco smoke. She has never used smokeless tobacco.  There were no vitals taken for this visit. Physical Examination:  She is an awake alert female ambulating without difficulty    The right shoulder has 0 to 180 degrees of active and 0 to 180 degrees passive forward elevation. Internal rotation is to T6. External rotation is to 60 degrees at the side. In the 90 degree abducted position 90 degrees of external and 90 degrees internal rotation  The AC joint is non-tender  SC joint is non-tender. Greater tuberosity is non-tender. negative biceps  Negative O'Briens sign  negative lift-off sign  Negative belly press sign  Negative bear huggers sign  negative drop sign  negative hornblower's sign  No posterior glenohumeral joint line tenderness. No evident excessive external rotation  Rotator cuff strength is 5/5.  negative external rotation stress test.   Negative empty can sign  There is no evident anterior or posterior apprehension with a negative sulcus sign.    No instability  negative external and internal Rotation lag sign  Neurovascularly intact. The right elbow has a range of motion of 0 to 135 with 85 degrees of supination and 75 degrees of pronation. Patient is non-tender over the medial epicondyle  non-tender over the ulnar nerve with no evidence of any subluxation or dislocation. Negative tinel at the cubital tunnel  Negative flexor pronator stress test.  Patient is non-tender over the radial tunnel  non-tender over the lateral epicondyle with a negative wrist extensor stress test.   The patient is non-tender when shaking hands. Negative middle finger extension stress test.  The biceps tendon is intact. Present hook test.  Negative reverse maribel sign  The right elbow is stable at 0, 30, 70, 90, degrees. No swelling or erythema over the olecranon bursa. Patient has 2+ radial and ulnar pulses and neurovascularly is intact. The left shoulder surgical incisions are intact  Active forward elevation is 0-90  Passive forward elevation 0-160  ER to 30  Biceps has good cosmetic appearance  She is neurovascularly intact    The left elbow has a range of motion of 0 to 135 with 85 degrees of supination and 75 degrees of pronation. Patient is non-tender over the medial epicondyle  non-tender over the ulnar nerve with no evidence of any subluxation or dislocation. Negative tinel at the cubital tunnel  Negative flexor pronator stress test.  Patient is non-tender over the radial tunnel  non-tender over the lateral epicondyle with a negative wrist extensor stress test.   The patient is non-tender when shaking hands. Negative middle finger extension stress test.  The biceps tendon is intact. Present hook test.  Negative reverse maribel sign  The left elbow is stable at 0, 30, 70, 90, degrees. No swelling or erythema over the olecranon bursa. Patient has 2+ radial and ulnar pulses and neurovascularly is intact.    Minimal left elbow discomfort      Data Reviewed:      MRI left shoulder dated 1/23/2023 demonstrates a type I acromion. Evidence of a distal clavicle resection. The rotator cuff repair appears intact. No definitive recurrent full-thickness cuff tear. Glenohumeral articular cartilage appears intact        Impression:   1. Encounter for postoperative care          Postop left shoulder and elbow sprain secondary to a fall  status post arthroscopy left shoulder ASD, ADCR, extensive debridement SLAP tear, biceps labral complex, glenohumeral joint, subacromial space, mini open rotator cuff repair and biceps tenodesis over 2 months  Chronic pain on Norco 10 mg 3 times a day  Overweight 5 foot 1 209 pounds BMI 39 status post gastric bypass    Plan:   I discussed the plan with the patient. I do not see a recurrent rotator cuff tear. She did have a large tear at the time of surgery. I will put her back in physical therapy working on full motion and full strength. She is taking Norco 10 3 times a day from pain management. We will switch her to oxycodone 10 and give her a prescription for gabapentin 200 3 times daily. I discussed the difficulty in treating postoperative pain in an individual who is on preoperative narcotic pain medication. I discussed the potential long-term outcomes regarding shoulder surgery when you are on preoperative narcotic pain medication. We will follow her pain medication utilization very closely. I will reassess her progress back in 1 month  Follow up: No follow-ups on file.              Dea Person MD

## 2023-01-25 ENCOUNTER — TELEPHONE (OUTPATIENT)
Dept: ORTHOPEDIC SURGERY | Age: 63
End: 2023-01-25

## 2023-01-25 NOTE — TELEPHONE ENCOUNTER
Called and spoke to pt who stated that pharmacy did not let her fill oxycodone yesterday, but they called her this morning and said that she was now able to fill it. Pt will call back if she is not able to fill rx.

## 2023-01-31 DIAGNOSIS — S43.402A SPRAIN OF LEFT SHOULDER, UNSPECIFIED SHOULDER SPRAIN TYPE, INITIAL ENCOUNTER: ICD-10-CM

## 2023-02-27 ENCOUNTER — OFFICE VISIT (OUTPATIENT)
Dept: ORTHOPEDIC SURGERY | Age: 63
End: 2023-02-27
Payer: MEDICARE

## 2023-02-27 DIAGNOSIS — Z47.89 ORTHOPEDIC AFTERCARE: Primary | ICD-10-CM

## 2023-02-27 PROCEDURE — G8484 FLU IMMUNIZE NO ADMIN: HCPCS | Performed by: ORTHOPAEDIC SURGERY

## 2023-02-27 PROCEDURE — 1036F TOBACCO NON-USER: CPT | Performed by: ORTHOPAEDIC SURGERY

## 2023-02-27 PROCEDURE — 99214 OFFICE O/P EST MOD 30 MIN: CPT | Performed by: ORTHOPAEDIC SURGERY

## 2023-02-27 PROCEDURE — G8427 DOCREV CUR MEDS BY ELIG CLIN: HCPCS | Performed by: ORTHOPAEDIC SURGERY

## 2023-02-27 PROCEDURE — G8417 CALC BMI ABV UP PARAM F/U: HCPCS | Performed by: ORTHOPAEDIC SURGERY

## 2023-02-27 PROCEDURE — 3017F COLORECTAL CA SCREEN DOC REV: CPT | Performed by: ORTHOPAEDIC SURGERY

## 2023-02-27 RX ORDER — GABAPENTIN 100 MG/1
CAPSULE ORAL
Qty: 180 CAPSULE | Refills: 0 | Status: SHIPPED | OUTPATIENT
Start: 2023-02-27 | End: 2023-03-29

## 2023-02-27 RX ORDER — OXYCODONE HYDROCHLORIDE 10 MG/1
10 TABLET ORAL EVERY 6 HOURS PRN
Qty: 40 TABLET | Refills: 0 | Status: SHIPPED | OUTPATIENT
Start: 2023-02-27 | End: 2023-03-09

## 2023-02-27 NOTE — PROGRESS NOTES
Name: Dani So  YOB: 1960  Gender: female  MRN: [id number]          HPI: Dani So is a 65 y.o. left-hand-dominant female seen for left shoulder problems. She is over 3 months status post arthroscopy left shoulder ASD, ADCR, extensive debridement SLAP tear, biceps labral complex, glenohumeral joint, subacromial space, mini open rotator cuff repair and biceps tenodesis. Operative findings are notable for a 3 cm rotator cuff tear. She did have a fall postop. She is in physical therapy. She is doing much better. ROS/Meds/PSH/PMH/FH/SH: A ten system review of systems was performed and is negative other than what is in the HPI. Tobacco:  reports that she has never smoked. She has never been exposed to tobacco smoke. She has never used smokeless tobacco.  There were no vitals taken for this visit. Physical Examination:  She is an awake alert female ambulating without difficulty    The right shoulder has 0 to 180 degrees of active and 0 to 180 degrees passive forward elevation. Internal rotation is to T6. External rotation is to 60 degrees at the side. In the 90 degree abducted position 90 degrees of external and 90 degrees internal rotation  The AC joint is non-tender  SC joint is non-tender. Greater tuberosity is non-tender. negative biceps  Negative O'Briens sign  negative lift-off sign  Negative belly press sign  Negative bear huggers sign  negative drop sign  negative hornblower's sign  No posterior glenohumeral joint line tenderness. No evident excessive external rotation  Rotator cuff strength is 5/5.  negative external rotation stress test.   Negative empty can sign  There is no evident anterior or posterior apprehension with a negative sulcus sign. No instability  negative external and internal Rotation lag sign  Neurovascularly intact.     The right elbow has a range of motion of 0 to 135 with 85 degrees of supination and 75 degrees of pronation. Patient is non-tender over the medial epicondyle  non-tender over the ulnar nerve with no evidence of any subluxation or dislocation. Negative tinel at the cubital tunnel  Negative flexor pronator stress test.  Patient is non-tender over the radial tunnel  non-tender over the lateral epicondyle with a negative wrist extensor stress test.   The patient is non-tender when shaking hands. Negative middle finger extension stress test.  The biceps tendon is intact. Present hook test.  Negative reverse maribel sign  The right elbow is stable at 0, 30, 70, 90, degrees. No swelling or erythema over the olecranon bursa. Patient has 2+ radial and ulnar pulses and neurovascularly is intact. The left shoulder surgical incisions are intact  Active forward elevation is 0-150  Passive forward elevation 0-160  ER to 30  IR to T12  Biceps has good cosmetic appearance  She is neurovascularly intact    The left elbow has a range of motion of 0 to 135 with 85 degrees of supination and 75 degrees of pronation. Patient is non-tender over the medial epicondyle  non-tender over the ulnar nerve with no evidence of any subluxation or dislocation. Negative tinel at the cubital tunnel  Negative flexor pronator stress test.  Patient is non-tender over the radial tunnel  non-tender over the lateral epicondyle with a negative wrist extensor stress test.   The patient is non-tender when shaking hands. Negative middle finger extension stress test.  The biceps tendon is intact. Present hook test.  Negative reverse maribel sign  The left elbow is stable at 0, 30, 70, 90, degrees. No swelling or erythema over the olecranon bursa. Patient has 2+ radial and ulnar pulses and neurovascularly is intact. Minimal left elbow discomfort      Data Reviewed:      MRI left shoulder dated 1/23/2023 demonstrates a type I acromion. Evidence of a distal clavicle resection. The rotator cuff repair appears intact.   No definitive recurrent full-thickness cuff tear. Glenohumeral articular cartilage appears intact        Impression:   1. Orthopedic aftercare          Postop left shoulder and elbow sprain secondary to a fall  status post arthroscopy left shoulder ASD, ADCR, extensive debridement SLAP tear, biceps labral complex, glenohumeral joint, subacromial space, mini open rotator cuff repair and biceps tenodesis over 3 months  Chronic pain on Norco 10 mg 3 times a day  Overweight 5 foot 1 209 pounds BMI 39 status post gastric bypass    Plan:   I discussed the plan with the patient. We will continue her in physical therapy working on full motion and full strength. She is taking Norco 10 3 times a day from pain management. We will refill her oxycodone 10 and gabapentin 200 3 times daily. I discussed the difficulty in treating postoperative pain in an individual who is on preoperative narcotic pain medication. I discussed the potential long-term outcomes regarding shoulder surgery when you are on preoperative narcotic pain medication. We will follow her pain medication utilization very closely. We will continue physical therapy working on full motion and full strength. I will reassess her progress back in 6 weeks  Follow up: Return in about 6 weeks (around 4/10/2023).              Sandra Paredes MD

## 2023-04-13 RX ORDER — NYSTATIN 100000 [USP'U]/G
POWDER TOPICAL
COMMUNITY
Start: 2023-03-20

## 2023-04-13 RX ORDER — SUCRALFATE 1 G/1
1 TABLET ORAL
COMMUNITY
Start: 2023-02-18

## 2023-04-13 RX ORDER — TOPIRAMATE 100 MG/1
1 TABLET, FILM COATED ORAL
COMMUNITY
Start: 2023-02-09

## 2023-04-13 RX ORDER — BUSPIRONE HYDROCHLORIDE 5 MG/1
5 TABLET ORAL 2 TIMES DAILY
COMMUNITY
Start: 2023-02-21

## 2023-04-28 ENCOUNTER — HOSPITAL ENCOUNTER (OUTPATIENT)
Age: 63
Setting detail: OUTPATIENT SURGERY
Discharge: HOME | End: 2023-04-28
Attending: SURGERY | Admitting: SURGERY
Payer: MEDICARE

## 2023-04-28 ENCOUNTER — ANESTHESIA EVENT (OUTPATIENT)
Dept: SURGERY | Age: 63
End: 2023-04-28
Payer: MEDICARE

## 2023-04-28 ENCOUNTER — ANESTHESIA (OUTPATIENT)
Dept: SURGERY | Age: 63
End: 2023-04-28
Payer: MEDICARE

## 2023-04-28 VITALS
WEIGHT: 198 LBS | HEIGHT: 61 IN | RESPIRATION RATE: 18 BRPM | OXYGEN SATURATION: 97 % | TEMPERATURE: 97.6 F | BODY MASS INDEX: 37.38 KG/M2 | SYSTOLIC BLOOD PRESSURE: 140 MMHG | DIASTOLIC BLOOD PRESSURE: 69 MMHG | HEART RATE: 56 BPM

## 2023-04-28 DIAGNOSIS — Z01.818 PREOP TESTING: Primary | ICD-10-CM

## 2023-04-28 LAB — HGB BLD-MCNC: 12.4 G/DL (ref 11.7–15.4)

## 2023-04-28 PROCEDURE — 6370000000 HC RX 637 (ALT 250 FOR IP): Performed by: ANESTHESIOLOGY

## 2023-04-28 PROCEDURE — 6360000002 HC RX W HCPCS: Performed by: ANESTHESIOLOGY

## 2023-04-28 PROCEDURE — 85018 HEMOGLOBIN: CPT

## 2023-04-28 PROCEDURE — 2580000003 HC RX 258: Performed by: ANESTHESIOLOGY

## 2023-04-28 RX ORDER — MIDAZOLAM HYDROCHLORIDE 2 MG/2ML
2 INJECTION, SOLUTION INTRAMUSCULAR; INTRAVENOUS
Status: DISCONTINUED | OUTPATIENT
Start: 2023-04-28 | End: 2023-04-28 | Stop reason: HOSPADM

## 2023-04-28 RX ORDER — SODIUM CHLORIDE 9 MG/ML
INJECTION, SOLUTION INTRAVENOUS PRN
Status: DISCONTINUED | OUTPATIENT
Start: 2023-04-28 | End: 2023-04-28 | Stop reason: HOSPADM

## 2023-04-28 RX ORDER — LIDOCAINE HYDROCHLORIDE 10 MG/ML
1 INJECTION, SOLUTION INFILTRATION; PERINEURAL
Status: DISCONTINUED | OUTPATIENT
Start: 2023-04-28 | End: 2023-04-28 | Stop reason: HOSPADM

## 2023-04-28 RX ORDER — SODIUM CHLORIDE 0.9 % (FLUSH) 0.9 %
5-40 SYRINGE (ML) INJECTION PRN
Status: DISCONTINUED | OUTPATIENT
Start: 2023-04-28 | End: 2023-04-28 | Stop reason: HOSPADM

## 2023-04-28 RX ORDER — SODIUM CHLORIDE 0.9 % (FLUSH) 0.9 %
5-40 SYRINGE (ML) INJECTION EVERY 12 HOURS SCHEDULED
Status: DISCONTINUED | OUTPATIENT
Start: 2023-04-28 | End: 2023-04-28 | Stop reason: HOSPADM

## 2023-04-28 RX ORDER — FENTANYL CITRATE 50 UG/ML
100 INJECTION, SOLUTION INTRAMUSCULAR; INTRAVENOUS
Status: DISCONTINUED | OUTPATIENT
Start: 2023-04-28 | End: 2023-04-28 | Stop reason: HOSPADM

## 2023-04-28 RX ORDER — ACETAMINOPHEN 500 MG
1000 TABLET ORAL ONCE
Status: DISCONTINUED | OUTPATIENT
Start: 2023-04-28 | End: 2023-04-28 | Stop reason: HOSPADM

## 2023-04-28 RX ORDER — SODIUM CHLORIDE, SODIUM LACTATE, POTASSIUM CHLORIDE, CALCIUM CHLORIDE 600; 310; 30; 20 MG/100ML; MG/100ML; MG/100ML; MG/100ML
INJECTION, SOLUTION INTRAVENOUS CONTINUOUS
Status: DISCONTINUED | OUTPATIENT
Start: 2023-04-28 | End: 2023-04-28 | Stop reason: HOSPADM

## 2023-04-28 RX ORDER — APREPITANT 40 MG/1
40 CAPSULE ORAL ONCE
Status: COMPLETED | OUTPATIENT
Start: 2023-04-28 | End: 2023-04-28

## 2023-04-28 RX ORDER — HALOPERIDOL 5 MG/ML
1 INJECTION INTRAMUSCULAR
Status: CANCELLED | OUTPATIENT
Start: 2023-04-28 | End: 2023-04-29

## 2023-04-28 RX ORDER — IPRATROPIUM BROMIDE AND ALBUTEROL SULFATE 2.5; .5 MG/3ML; MG/3ML
1 SOLUTION RESPIRATORY (INHALATION)
Status: CANCELLED | OUTPATIENT
Start: 2023-04-28 | End: 2023-04-29

## 2023-04-28 RX ORDER — HYDROMORPHONE HYDROCHLORIDE 2 MG/ML
0.5 INJECTION, SOLUTION INTRAMUSCULAR; INTRAVENOUS; SUBCUTANEOUS EVERY 5 MIN PRN
Status: CANCELLED | OUTPATIENT
Start: 2023-04-28

## 2023-04-28 RX ORDER — SCOLOPAMINE TRANSDERMAL SYSTEM 1 MG/1
1 PATCH, EXTENDED RELEASE TRANSDERMAL ONCE
Status: DISCONTINUED | OUTPATIENT
Start: 2023-04-28 | End: 2023-04-28 | Stop reason: HOSPADM

## 2023-04-28 RX ADMIN — SODIUM CHLORIDE, POTASSIUM CHLORIDE, SODIUM LACTATE AND CALCIUM CHLORIDE: 600; 310; 30; 20 INJECTION, SOLUTION INTRAVENOUS at 10:06

## 2023-04-28 RX ADMIN — APREPITANT 40 MG: 40 CAPSULE ORAL at 10:02

## 2023-04-28 ASSESSMENT — PAIN - FUNCTIONAL ASSESSMENT: PAIN_FUNCTIONAL_ASSESSMENT: 0-10

## 2023-04-28 NOTE — ANESTHESIA PRE PROCEDURE
reviewed and Nursing notes reviewed history of anesthetic complications (delayed emergence): Airway: Mallampati: II  TM distance: >3 FB   Neck ROM: full  Mouth opening: > = 3 FB   Dental:    (+) edentulous      Pulmonary:Negative Pulmonary ROS and normal exam                               Cardiovascular:  Exercise tolerance: good (>4 METS),                 Stress test reviewed             ROS comment: Stress perfusion scan Feb '23:   LV Function: EF is 59%. LV function is normal. The wall motion is   normal.     Left ventricle cavity size is normal.     Suboptimal stress and rest perfusion imaging secondary to bowel and   tissue attenuation.  No obvious reversible ischemia. Neuro/Psych:   (+) depression/anxiety             GI/Hepatic/Renal:   (+) GERD: well controlled, morbid obesity          Endo/Other:                     Abdominal:             Vascular: negative vascular ROS. Other Findings:             Anesthesia Plan      general     ASA 3     (Add emend and scope patch)  Induction: intravenous. Anesthetic plan and risks discussed with patient (and a family member).                         Beryle Dash, MD   4/28/2023

## 2023-05-07 ENCOUNTER — ANESTHESIA EVENT (OUTPATIENT)
Dept: SURGERY | Age: 63
End: 2023-05-07
Payer: MEDICARE

## 2023-05-08 ENCOUNTER — ANESTHESIA (OUTPATIENT)
Dept: SURGERY | Age: 63
End: 2023-05-08
Payer: MEDICARE

## 2023-05-08 ENCOUNTER — HOSPITAL ENCOUNTER (OUTPATIENT)
Age: 63
Setting detail: OUTPATIENT SURGERY
Discharge: HOME | End: 2023-05-08
Attending: SURGERY | Admitting: SURGERY
Payer: MEDICARE

## 2023-05-08 VITALS
WEIGHT: 192.25 LBS | BODY MASS INDEX: 36.3 KG/M2 | HEIGHT: 61 IN | HEART RATE: 62 BPM | RESPIRATION RATE: 16 BRPM | TEMPERATURE: 97.2 F | DIASTOLIC BLOOD PRESSURE: 56 MMHG | SYSTOLIC BLOOD PRESSURE: 113 MMHG | OXYGEN SATURATION: 98 %

## 2023-05-08 DIAGNOSIS — G89.18 POST-OP PAIN: Primary | ICD-10-CM

## 2023-05-08 PROCEDURE — 7100000000 HC PACU RECOVERY - FIRST 15 MIN: Performed by: SURGERY

## 2023-05-08 PROCEDURE — 6360000002 HC RX W HCPCS: Performed by: SURGERY

## 2023-05-08 PROCEDURE — 6360000002 HC RX W HCPCS: Performed by: ANESTHESIOLOGY

## 2023-05-08 PROCEDURE — 2580000003 HC RX 258: Performed by: ANESTHESIOLOGY

## 2023-05-08 PROCEDURE — 3700000000 HC ANESTHESIA ATTENDED CARE: Performed by: SURGERY

## 2023-05-08 PROCEDURE — 2500000003 HC RX 250 WO HCPCS: Performed by: REGISTERED NURSE

## 2023-05-08 PROCEDURE — 6370000000 HC RX 637 (ALT 250 FOR IP): Performed by: ANESTHESIOLOGY

## 2023-05-08 PROCEDURE — 3700000001 HC ADD 15 MINUTES (ANESTHESIA): Performed by: SURGERY

## 2023-05-08 PROCEDURE — 2580000003 HC RX 258: Performed by: REGISTERED NURSE

## 2023-05-08 PROCEDURE — 3600000012 HC SURGERY LEVEL 2 ADDTL 15MIN: Performed by: SURGERY

## 2023-05-08 PROCEDURE — 3600000002 HC SURGERY LEVEL 2 BASE: Performed by: SURGERY

## 2023-05-08 PROCEDURE — 7100000010 HC PHASE II RECOVERY - FIRST 15 MIN: Performed by: SURGERY

## 2023-05-08 PROCEDURE — 7100000011 HC PHASE II RECOVERY - ADDTL 15 MIN: Performed by: SURGERY

## 2023-05-08 PROCEDURE — 2709999900 HC NON-CHARGEABLE SUPPLY: Performed by: SURGERY

## 2023-05-08 PROCEDURE — 6360000002 HC RX W HCPCS: Performed by: REGISTERED NURSE

## 2023-05-08 PROCEDURE — 7100000001 HC PACU RECOVERY - ADDTL 15 MIN: Performed by: SURGERY

## 2023-05-08 PROCEDURE — 2500000003 HC RX 250 WO HCPCS: Performed by: SURGERY

## 2023-05-08 RX ORDER — EPHEDRINE SULFATE/0.9% NACL/PF 50 MG/5 ML
SYRINGE (ML) INTRAVENOUS PRN
Status: DISCONTINUED | OUTPATIENT
Start: 2023-05-08 | End: 2023-05-08 | Stop reason: SDUPTHER

## 2023-05-08 RX ORDER — NEOSTIGMINE METHYLSULFATE 1 MG/ML
INJECTION, SOLUTION INTRAVENOUS PRN
Status: DISCONTINUED | OUTPATIENT
Start: 2023-05-08 | End: 2023-05-08 | Stop reason: SDUPTHER

## 2023-05-08 RX ORDER — HYDROMORPHONE HYDROCHLORIDE 2 MG/ML
0.25 INJECTION, SOLUTION INTRAMUSCULAR; INTRAVENOUS; SUBCUTANEOUS EVERY 5 MIN PRN
Status: DISCONTINUED | OUTPATIENT
Start: 2023-05-08 | End: 2023-05-08 | Stop reason: HOSPADM

## 2023-05-08 RX ORDER — DEXAMETHASONE SODIUM PHOSPHATE 10 MG/ML
INJECTION INTRAMUSCULAR; INTRAVENOUS PRN
Status: DISCONTINUED | OUTPATIENT
Start: 2023-05-08 | End: 2023-05-08 | Stop reason: SDUPTHER

## 2023-05-08 RX ORDER — HYDROCODONE BITARTRATE AND ACETAMINOPHEN 5; 325 MG/1; MG/1
1 TABLET ORAL EVERY 4 HOURS PRN
Qty: 30 TABLET | Refills: 0 | Status: SHIPPED | OUTPATIENT
Start: 2023-05-08 | End: 2023-05-13

## 2023-05-08 RX ORDER — SODIUM CHLORIDE 0.9 % (FLUSH) 0.9 %
5-40 SYRINGE (ML) INJECTION EVERY 12 HOURS SCHEDULED
Status: DISCONTINUED | OUTPATIENT
Start: 2023-05-08 | End: 2023-05-08 | Stop reason: HOSPADM

## 2023-05-08 RX ORDER — ROCURONIUM BROMIDE 10 MG/ML
INJECTION, SOLUTION INTRAVENOUS PRN
Status: DISCONTINUED | OUTPATIENT
Start: 2023-05-08 | End: 2023-05-08 | Stop reason: SDUPTHER

## 2023-05-08 RX ORDER — SCOLOPAMINE TRANSDERMAL SYSTEM 1 MG/1
1 PATCH, EXTENDED RELEASE TRANSDERMAL
Status: DISCONTINUED | OUTPATIENT
Start: 2023-05-08 | End: 2023-05-08 | Stop reason: HOSPADM

## 2023-05-08 RX ORDER — SODIUM CHLORIDE, SODIUM LACTATE, POTASSIUM CHLORIDE, CALCIUM CHLORIDE 600; 310; 30; 20 MG/100ML; MG/100ML; MG/100ML; MG/100ML
INJECTION, SOLUTION INTRAVENOUS CONTINUOUS
Status: DISCONTINUED | OUTPATIENT
Start: 2023-05-08 | End: 2023-05-08 | Stop reason: HOSPADM

## 2023-05-08 RX ORDER — KETAMINE HYDROCHLORIDE 50 MG/ML
INJECTION, SOLUTION, CONCENTRATE INTRAMUSCULAR; INTRAVENOUS PRN
Status: DISCONTINUED | OUTPATIENT
Start: 2023-05-08 | End: 2023-05-08 | Stop reason: SDUPTHER

## 2023-05-08 RX ORDER — LIDOCAINE HYDROCHLORIDE 10 MG/ML
1 INJECTION, SOLUTION INFILTRATION; PERINEURAL
Status: DISCONTINUED | OUTPATIENT
Start: 2023-05-08 | End: 2023-05-08 | Stop reason: HOSPADM

## 2023-05-08 RX ORDER — ONDANSETRON 2 MG/ML
4 INJECTION INTRAMUSCULAR; INTRAVENOUS
Status: DISCONTINUED | OUTPATIENT
Start: 2023-05-08 | End: 2023-05-08 | Stop reason: HOSPADM

## 2023-05-08 RX ORDER — EPINEPHRINE 1 MG/ML(1)
AMPUL (ML) INJECTION PRN
Status: DISCONTINUED | OUTPATIENT
Start: 2023-05-08 | End: 2023-05-08 | Stop reason: ALTCHOICE

## 2023-05-08 RX ORDER — SODIUM CHLORIDE 9 MG/ML
INJECTION, SOLUTION INTRAVENOUS PRN
Status: DISCONTINUED | OUTPATIENT
Start: 2023-05-08 | End: 2023-05-08 | Stop reason: HOSPADM

## 2023-05-08 RX ORDER — SUCCINYLCHOLINE CHLORIDE 20 MG/ML
INJECTION INTRAMUSCULAR; INTRAVENOUS PRN
Status: DISCONTINUED | OUTPATIENT
Start: 2023-05-08 | End: 2023-05-08 | Stop reason: SDUPTHER

## 2023-05-08 RX ORDER — HYDROMORPHONE HYDROCHLORIDE 2 MG/ML
0.5 INJECTION, SOLUTION INTRAMUSCULAR; INTRAVENOUS; SUBCUTANEOUS EVERY 5 MIN PRN
Status: DISCONTINUED | OUTPATIENT
Start: 2023-05-08 | End: 2023-05-08 | Stop reason: HOSPADM

## 2023-05-08 RX ORDER — ACETAMINOPHEN 500 MG
1000 TABLET ORAL ONCE
Status: DISCONTINUED | OUTPATIENT
Start: 2023-05-08 | End: 2023-05-08 | Stop reason: HOSPADM

## 2023-05-08 RX ORDER — ONDANSETRON 2 MG/ML
INJECTION INTRAMUSCULAR; INTRAVENOUS PRN
Status: DISCONTINUED | OUTPATIENT
Start: 2023-05-08 | End: 2023-05-08 | Stop reason: SDUPTHER

## 2023-05-08 RX ORDER — OXYCODONE HYDROCHLORIDE 5 MG/1
5 TABLET ORAL PRN
Status: COMPLETED | OUTPATIENT
Start: 2023-05-08 | End: 2023-05-08

## 2023-05-08 RX ORDER — OXYCODONE HYDROCHLORIDE 5 MG/1
10 TABLET ORAL PRN
Status: COMPLETED | OUTPATIENT
Start: 2023-05-08 | End: 2023-05-08

## 2023-05-08 RX ORDER — MIDAZOLAM HYDROCHLORIDE 2 MG/2ML
2 INJECTION, SOLUTION INTRAMUSCULAR; INTRAVENOUS
Status: DISCONTINUED | OUTPATIENT
Start: 2023-05-08 | End: 2023-05-08 | Stop reason: HOSPADM

## 2023-05-08 RX ORDER — SODIUM CHLORIDE 0.9 % (FLUSH) 0.9 %
5-40 SYRINGE (ML) INJECTION PRN
Status: DISCONTINUED | OUTPATIENT
Start: 2023-05-08 | End: 2023-05-08 | Stop reason: HOSPADM

## 2023-05-08 RX ORDER — LIDOCAINE HYDROCHLORIDE 10 MG/ML
INJECTION, SOLUTION INFILTRATION; PERINEURAL PRN
Status: DISCONTINUED | OUTPATIENT
Start: 2023-05-08 | End: 2023-05-08 | Stop reason: ALTCHOICE

## 2023-05-08 RX ORDER — PROPOFOL 10 MG/ML
INJECTION, EMULSION INTRAVENOUS PRN
Status: DISCONTINUED | OUTPATIENT
Start: 2023-05-08 | End: 2023-05-08 | Stop reason: SDUPTHER

## 2023-05-08 RX ORDER — GLYCOPYRROLATE 0.2 MG/ML
INJECTION INTRAMUSCULAR; INTRAVENOUS PRN
Status: DISCONTINUED | OUTPATIENT
Start: 2023-05-08 | End: 2023-05-08 | Stop reason: SDUPTHER

## 2023-05-08 RX ORDER — LIDOCAINE HYDROCHLORIDE 20 MG/ML
INJECTION, SOLUTION EPIDURAL; INFILTRATION; INTRACAUDAL; PERINEURAL PRN
Status: DISCONTINUED | OUTPATIENT
Start: 2023-05-08 | End: 2023-05-08 | Stop reason: SDUPTHER

## 2023-05-08 RX ADMIN — Medication 1 MG: at 09:10

## 2023-05-08 RX ADMIN — ROCURONIUM BROMIDE 5 MG: 50 INJECTION, SOLUTION INTRAVENOUS at 07:40

## 2023-05-08 RX ADMIN — Medication 140 MG: at 07:40

## 2023-05-08 RX ADMIN — Medication 5 MG: at 07:45

## 2023-05-08 RX ADMIN — PHENYLEPHRINE HYDROCHLORIDE 50 MCG: 10 INJECTION INTRAVENOUS at 08:00

## 2023-05-08 RX ADMIN — FENTANYL CITRATE 50 MCG: 50 INJECTION INTRAMUSCULAR; INTRAVENOUS at 07:40

## 2023-05-08 RX ADMIN — Medication 5 MG: at 07:53

## 2023-05-08 RX ADMIN — LIDOCAINE HYDROCHLORIDE 80 MG: 20 INJECTION, SOLUTION EPIDURAL; INFILTRATION; INTRACAUDAL; PERINEURAL at 07:40

## 2023-05-08 RX ADMIN — OXYCODONE HYDROCHLORIDE 10 MG: 5 TABLET ORAL at 10:35

## 2023-05-08 RX ADMIN — PHENYLEPHRINE HYDROCHLORIDE 50 MCG: 10 INJECTION INTRAVENOUS at 07:45

## 2023-05-08 RX ADMIN — DEXAMETHASONE SODIUM PHOSPHATE 10 MG: 10 INJECTION INTRAMUSCULAR; INTRAVENOUS at 07:55

## 2023-05-08 RX ADMIN — KETAMINE HYDROCHLORIDE 15 MG: 50 INJECTION, SOLUTION INTRAMUSCULAR; INTRAVENOUS at 08:45

## 2023-05-08 RX ADMIN — FENTANYL CITRATE 50 MCG: 50 INJECTION INTRAMUSCULAR; INTRAVENOUS at 08:08

## 2023-05-08 RX ADMIN — KETAMINE HYDROCHLORIDE 30 MG: 50 INJECTION, SOLUTION INTRAMUSCULAR; INTRAVENOUS at 07:47

## 2023-05-08 RX ADMIN — ROCURONIUM BROMIDE 20 MG: 50 INJECTION, SOLUTION INTRAVENOUS at 08:08

## 2023-05-08 RX ADMIN — Medication 5 MG: at 08:00

## 2023-05-08 RX ADMIN — Medication 10 MG: at 07:50

## 2023-05-08 RX ADMIN — HYDROMORPHONE HYDROCHLORIDE 0.25 MG: 2 INJECTION, SOLUTION INTRAMUSCULAR; INTRAVENOUS; SUBCUTANEOUS at 09:54

## 2023-05-08 RX ADMIN — PROPOFOL 200 MG: 10 INJECTION, EMULSION INTRAVENOUS at 07:40

## 2023-05-08 RX ADMIN — Medication 2 MG: at 07:55

## 2023-05-08 RX ADMIN — SODIUM CHLORIDE, POTASSIUM CHLORIDE, SODIUM LACTATE AND CALCIUM CHLORIDE: 600; 310; 30; 20 INJECTION, SOLUTION INTRAVENOUS at 06:13

## 2023-05-08 RX ADMIN — HYDROMORPHONE HYDROCHLORIDE 0.5 MG: 2 INJECTION, SOLUTION INTRAMUSCULAR; INTRAVENOUS; SUBCUTANEOUS at 09:46

## 2023-05-08 RX ADMIN — GLYCOPYRROLATE 0.2 MG: 0.2 INJECTION INTRAMUSCULAR; INTRAVENOUS at 09:10

## 2023-05-08 RX ADMIN — Medication 1 MG: at 09:12

## 2023-05-08 RX ADMIN — Medication 1 MG: at 09:11

## 2023-05-08 RX ADMIN — GLYCOPYRROLATE 0.2 MG: 0.2 INJECTION INTRAMUSCULAR; INTRAVENOUS at 09:12

## 2023-05-08 RX ADMIN — PHENYLEPHRINE HYDROCHLORIDE 50 MCG: 10 INJECTION INTRAVENOUS at 07:53

## 2023-05-08 RX ADMIN — ONDANSETRON 4 MG: 2 INJECTION INTRAMUSCULAR; INTRAVENOUS at 09:10

## 2023-05-08 ASSESSMENT — PAIN DESCRIPTION - LOCATION
LOCATION: ABDOMEN

## 2023-05-08 ASSESSMENT — PAIN DESCRIPTION - ORIENTATION
ORIENTATION: LOWER
ORIENTATION: LOWER

## 2023-05-08 ASSESSMENT — PAIN SCALES - GENERAL
PAINLEVEL_OUTOF10: 8
PAINLEVEL_OUTOF10: 6

## 2023-05-08 ASSESSMENT — PAIN - FUNCTIONAL ASSESSMENT: PAIN_FUNCTIONAL_ASSESSMENT: 0-10

## 2023-05-08 NOTE — PROGRESS NOTES
CC: Abdominal panniculus    HPI: 66 yo with history of recurrent issues with abdominal panniculus presents for panniculectomy. Past Medical History:   Diagnosis Date    Abnormal Pap smear of cervix     Anemia     denies hx of blood transfusions    Anxiety and depression     COVID-19 2021    denies hospitalization    Degenerative joint disease of left acromioclavicular joint 10/17/2022    Depression 10/22/2015    GERD (gastroesophageal reflux disease)     carafate and omeprazole daily, well controlled    Osteoarthritis     Other ill-defined conditions(799.89)     anemic    Prolonged emergence from general anesthesia 2005    has been admitted for difficulty waking, but no problems breathing     Past Surgical History:   Procedure Laterality Date    ANKLE ARTHROSCOPY Left 2017    CHOLECYSTECTOMY      COLONOSCOPY      due again in [facility]  2005    [facility]    ORTHOPEDIC SURGERY      right ankle    SHOULDER ARTHROSCOPY Left 11/18/2022    ARTHROSCOPY LEFT SHOULDER ARTHROSCOPIC SUBACROMIAL DECOMPRESSION, DISTAL CLAVICLE RESECTION, EXTENSIVE DEBRIDEMENT SLAP TEAR, BICEPS LABRAL COMPLEX, GLENOHUMERAL JOINT, SUBACROMIAL SPACE, MINI OPEN ROTATOR CUFF REPAIR, BICEPS TENODESIS  performed by Lisa Hendrickson MD at . [address]:    05/08/23 0551   BP: 126/60   Pulse: 61   Resp: 16   Temp: 97.6 °F (36.4 °C)   SpO2: 99%     A&O x3  RRR  Resp clear  Abdomen marked    A/P:  Will proceed with panniculectomy. Risks, benefits and alternatives discussed. Consent affirmed.

## 2023-05-08 NOTE — ANESTHESIA PROCEDURE NOTES
Airway  Date/Time: 5/8/2023 7:42 AM  Urgency: elective    Airway not difficult    General Information and Staff    Patient location during procedure: OR  Resident/CRNA: HAMIDA Manning - CRNA  Performed: resident/CRNA     Indications and Patient Condition  Indications for airway management: anesthesia  Spontaneous Ventilation: absent  Sedation level: deep  Preoxygenated: yes  Patient position: sniffing  MILS not maintained throughout  Mask difficulty assessment: not attempted    Final Airway Details  Final airway type: endotracheal airway      Successful airway: ETT  Cuffed: yes   Successful intubation technique: direct laryngoscopy  Facilitating devices/methods: intubating stylet, cricoid pressure and anterior pressure/BURP  Endotracheal tube insertion site: oral  Blade: Debbie  Blade size: #3  ETT size (mm): 7.0  Cormack-Lehane Classification: grade I - full view of glottis  Placement verified by: chest auscultation and capnometry   Measured from: lips  ETT to lips (cm): 19  Number of attempts at approach: 1    Additional Comments  Soft tissue and lips unchanged from preop

## 2023-05-08 NOTE — ANESTHESIA PRE PROCEDURE
reflux disease without esophagitis K21.9    Depression F32. A    Nontraumatic complete tear of left rotator cuff M75.122    Bicipital tendinitis of left shoulder M75.22    Degenerative joint disease of left acromioclavicular joint M19.012    Superior glenoid labrum lesion of left shoulder S43.432A       Past Medical History:        Diagnosis Date    Abnormal Pap smear of cervix     Anemia     denies hx of blood transfusions    Anxiety and depression     COVID-19 2021    denies hospitalization    Degenerative joint disease of left acromioclavicular joint 10/17/2022    Depression 10/22/2015    GERD (gastroesophageal reflux disease)     carafate and omeprazole daily, well controlled    Osteoarthritis     Other ill-defined conditions(799.89)     anemic    Prolonged emergence from general anesthesia 2005    has been admitted for difficulty waking, but no problems breathing       Past Surgical History:        Procedure Laterality Date    ANKLE ARTHROSCOPY Left 2017    CHOLECYSTECTOMY      COLONOSCOPY      due again in 2020   [address] GASTRIC BYPASS SURGERY  2005    [facility]than    ORTHOPEDIC SURGERY      right ankle    SHOULDER ARTHROSCOPY Left 11/18/2022    ARTHROSCOPY LEFT SHOULDER ARTHROSCOPIC SUBACROMIAL DECOMPRESSION, DISTAL CLAVICLE RESECTION, EXTENSIVE DEBRIDEMENT SLAP TEAR, BICEPS LABRAL COMPLEX, GLENOHUMERAL JOINT, SUBACROMIAL SPACE, MINI OPEN ROTATOR CUFF REPAIR, BICEPS TENODESIS  performed by Ad Brown MD at [address] [facility]         Social History:    Social History     Tobacco Use    Smoking status: Never     Passive exposure: Never    Smokeless tobacco: Never   Substance Use Topics    Alcohol use: Yes     Comment: 1-2/ month                                Counseling given: Not Answered      Vital Signs (Current):   Vitals:    05/02/23 1531 05/08/23 0551   BP:  126/60   Pulse:  61   Resp:  16   Temp:  97.6 °F (36.4 °C)   TempSrc:  Skin

## 2023-05-08 NOTE — ANESTHESIA POSTPROCEDURE EVALUATION
Department of Anesthesiology  Postprocedure Note    Patient: Caryn Prado  MRN: [id number]  YOB: 1960  Date of evaluation: 5/8/2023      Procedure Summary     Date: 05/08/23 Room / Location: [facility] MAIN OR  / [facility] MAIN OR    Anesthesia Start: 0724 Anesthesia Stop: 4363    Procedure: PANNICULECTOMY EXCISION  SUBCUTANEOUS/EXCESSIVE (Abdomen) Diagnosis:       Panniculitis      (Panniculitis [M79.3])    Providers: Kallie Wells MD Responsible Provider: Bree Farias MD    Anesthesia Type: general ASA Status: 2          Anesthesia Type: No value filed. Gama Phase I: Gama Score: 8    Gama Phase II:        Anesthesia Post Evaluation    Patient location during evaluation: PACU  Patient participation: complete - patient participated  Level of consciousness: awake and alert  Airway patency: patent  Nausea & Vomiting: no nausea and no vomiting  Complications: no  Cardiovascular status: hemodynamically stable  Respiratory status: acceptable  Hydration status: euvolemic  Comments: Blood pressure (!) 109/56, pulse 55, temperature 97.2 °F (36.2 °C), temperature source Temporal, resp. rate 16, height 5' 1\" (1.549 m), weight 192 lb 4 oz (87.2 kg), SpO2 100 %.       Pt stable for discharge from PACU  Multimodal analgesia pain management approach

## 2023-05-08 NOTE — DISCHARGE INSTRUCTIONS
taking narcotics and any of your home medications that may cause drowsiness. *  Please give a list of your current medications to your Primary Care Provider. *  Please update this list whenever your medications are discontinued, doses are      changed, or new medications (including over-the-counter products) are added. *  Please carry medication information at all times in case of emergency situations. These are general instructions for a healthy lifestyle:  No smoking/ No tobacco products/ Avoid exposure to second hand smoke  Surgeon General's Warning:  Quitting smoking now greatly reduces serious risk to your health. Obesity, smoking, and sedentary lifestyle greatly increases your risk for illness  A healthy diet, regular physical exercise & weight monitoring are important for maintaining a healthy lifestyle    You may be retaining fluid if you have a history of heart failure or if you experience any of the following symptoms:  Weight gain of 3 pounds or more overnight or 5 pounds in a week, increased swelling in our hands or feet or shortness of breath while lying flat in bed. Please call your doctor as soon as you notice any of these symptoms; do not wait until your next office visit.

## 2023-10-08 ENCOUNTER — APPOINTMENT (OUTPATIENT)
Dept: CT IMAGING | Age: 63
End: 2023-10-08
Payer: MEDICARE

## 2023-10-08 ENCOUNTER — HOSPITAL ENCOUNTER (EMERGENCY)
Age: 63
Discharge: HOME | End: 2023-10-08
Payer: MEDICARE

## 2023-10-08 VITALS
OXYGEN SATURATION: 100 % | HEART RATE: 57 BPM | DIASTOLIC BLOOD PRESSURE: 81 MMHG | SYSTOLIC BLOOD PRESSURE: 171 MMHG | WEIGHT: 183 LBS | RESPIRATION RATE: 20 BRPM | BODY MASS INDEX: 34.55 KG/M2 | TEMPERATURE: 97.7 F | HEIGHT: 61 IN

## 2023-10-08 DIAGNOSIS — R30.0 DYSURIA: Primary | ICD-10-CM

## 2023-10-08 LAB
ALBUMIN SERPL-MCNC: 3.4 G/DL (ref 3.2–4.6)
ALBUMIN/GLOB SERPL: 1 (ref 0.4–1.6)
ALP SERPL-CCNC: 79 U/L (ref 50–136)
ALT SERPL-CCNC: 26 U/L (ref 12–65)
ANION GAP SERPL CALC-SCNC: 4 MMOL/L (ref 2–11)
APPEARANCE UR: CLEAR
AST SERPL-CCNC: 21 U/L (ref 15–37)
BASOPHILS # BLD: 0 K/UL (ref 0–0.2)
BASOPHILS NFR BLD: 1 % (ref 0–2)
BILIRUB SERPL-MCNC: 0.3 MG/DL (ref 0.2–1.1)
BILIRUB UR QL: NEGATIVE
BUN SERPL-MCNC: 13 MG/DL (ref 8–23)
CALCIUM SERPL-MCNC: 8.7 MG/DL (ref 8.3–10.4)
CHLORIDE SERPL-SCNC: 108 MMOL/L (ref 101–110)
CO2 SERPL-SCNC: 28 MMOL/L (ref 21–32)
COLOR UR: ABNORMAL
CREAT SERPL-MCNC: 0.75 MG/DL (ref 0.6–1)
DIFFERENTIAL METHOD BLD: ABNORMAL
EOSINOPHIL # BLD: 0.1 K/UL (ref 0–0.8)
EOSINOPHIL NFR BLD: 2 % (ref 0.5–7.8)
ERYTHROCYTE [DISTWIDTH] IN BLOOD BY AUTOMATED COUNT: 14.2 % (ref 11.9–14.6)
GLOBULIN SER CALC-MCNC: 3.3 G/DL (ref 2.8–4.5)
GLUCOSE SERPL-MCNC: 84 MG/DL (ref 65–100)
GLUCOSE UR STRIP.AUTO-MCNC: NEGATIVE MG/DL
HCT VFR BLD AUTO: 36.9 % (ref 35.8–46.3)
HGB BLD-MCNC: 11.8 G/DL (ref 11.7–15.4)
HGB UR QL STRIP: NEGATIVE
IMM GRANULOCYTES # BLD AUTO: 0 K/UL (ref 0–0.5)
IMM GRANULOCYTES NFR BLD AUTO: 0 % (ref 0–5)
KETONES UR QL STRIP.AUTO: NEGATIVE MG/DL
LEUKOCYTE ESTERASE UR QL STRIP.AUTO: NEGATIVE
LYMPHOCYTES # BLD: 2 K/UL (ref 0.5–4.6)
LYMPHOCYTES NFR BLD: 61 % (ref 13–44)
MCH RBC QN AUTO: 30.2 PG (ref 26.1–32.9)
MCHC RBC AUTO-ENTMCNC: 32 G/DL (ref 31.4–35)
MCV RBC AUTO: 94.4 FL (ref 82–102)
MONOCYTES # BLD: 0.3 K/UL (ref 0.1–1.3)
MONOCYTES NFR BLD: 10 % (ref 4–12)
NEUTS SEG # BLD: 0.9 K/UL (ref 1.7–8.2)
NEUTS SEG NFR BLD: 27 % (ref 43–78)
NITRITE UR QL STRIP.AUTO: NEGATIVE
NRBC # BLD: 0 K/UL (ref 0–0.2)
PH UR STRIP: 5.5 (ref 5–9)
PLATELET # BLD AUTO: 248 K/UL (ref 150–450)
PMV BLD AUTO: 10.4 FL (ref 9.4–12.3)
POTASSIUM SERPL-SCNC: 4 MMOL/L (ref 3.5–5.1)
PROT SERPL-MCNC: 6.7 G/DL (ref 6.3–8.2)
PROT UR STRIP-MCNC: NEGATIVE MG/DL
RBC # BLD AUTO: 3.91 M/UL (ref 4.05–5.2)
SODIUM SERPL-SCNC: 140 MMOL/L (ref 133–143)
SP GR UR REFRACTOMETRY: 1.03 (ref 1–1.02)
UROBILINOGEN UR QL STRIP.AUTO: 0.2 EU/DL (ref 0.2–1)
WBC # BLD AUTO: 3.4 K/UL (ref 4.3–11.1)

## 2023-10-08 PROCEDURE — 87086 URINE CULTURE/COLONY COUNT: CPT

## 2023-10-08 PROCEDURE — 6370000000 HC RX 637 (ALT 250 FOR IP): Performed by: PHYSICIAN ASSISTANT

## 2023-10-08 PROCEDURE — 85025 COMPLETE CBC W/AUTO DIFF WBC: CPT

## 2023-10-08 PROCEDURE — 80053 COMPREHEN METABOLIC PANEL: CPT

## 2023-10-08 PROCEDURE — 74177 CT ABD & PELVIS W/CONTRAST: CPT

## 2023-10-08 PROCEDURE — 6360000004 HC RX CONTRAST MEDICATION: Performed by: PHYSICIAN ASSISTANT

## 2023-10-08 PROCEDURE — 96374 THER/PROPH/DIAG INJ IV PUSH: CPT

## 2023-10-08 PROCEDURE — 6360000002 HC RX W HCPCS: Performed by: PHYSICIAN ASSISTANT

## 2023-10-08 PROCEDURE — 99285 EMERGENCY DEPT VISIT HI MDM: CPT

## 2023-10-08 PROCEDURE — 81003 URINALYSIS AUTO W/O SCOPE: CPT

## 2023-10-08 RX ORDER — CEPHALEXIN 500 MG/1
500 CAPSULE ORAL 4 TIMES DAILY
Qty: 28 CAPSULE | Refills: 0 | Status: SHIPPED | OUTPATIENT
Start: 2023-10-08 | End: 2023-10-15

## 2023-10-08 RX ORDER — HYDROCODONE BITARTRATE AND ACETAMINOPHEN 10; 325 MG/1; MG/1
1 TABLET ORAL EVERY 6 HOURS PRN
COMMUNITY

## 2023-10-08 RX ORDER — KETOROLAC TROMETHAMINE 15 MG/ML
15 INJECTION, SOLUTION INTRAMUSCULAR; INTRAVENOUS ONCE
Status: COMPLETED | OUTPATIENT
Start: 2023-10-08 | End: 2023-10-08

## 2023-10-08 RX ORDER — ACETAMINOPHEN 500 MG
1000 TABLET ORAL
Status: COMPLETED | OUTPATIENT
Start: 2023-10-08 | End: 2023-10-08

## 2023-10-08 RX ADMIN — IOPAMIDOL 100 ML: 755 INJECTION, SOLUTION INTRAVENOUS at 14:12

## 2023-10-08 RX ADMIN — ACETAMINOPHEN 1000 MG: 500 TABLET, FILM COATED ORAL at 15:56

## 2023-10-08 RX ADMIN — KETOROLAC TROMETHAMINE 15 MG: 15 INJECTION, SOLUTION INTRAMUSCULAR; INTRAVENOUS at 13:00

## 2023-10-08 ASSESSMENT — LIFESTYLE VARIABLES
HOW OFTEN DO YOU HAVE A DRINK CONTAINING ALCOHOL: MONTHLY OR LESS
HOW MANY STANDARD DRINKS CONTAINING ALCOHOL DO YOU HAVE ON A TYPICAL DAY: 1 OR 2

## 2023-10-08 ASSESSMENT — PAIN DESCRIPTION - ORIENTATION: ORIENTATION: RIGHT;LOWER

## 2023-10-08 ASSESSMENT — PAIN SCALES - GENERAL
PAINLEVEL_OUTOF10: 8
PAINLEVEL_OUTOF10: 7
PAINLEVEL_OUTOF10: 6

## 2023-10-08 ASSESSMENT — PAIN - FUNCTIONAL ASSESSMENT
PAIN_FUNCTIONAL_ASSESSMENT: ACTIVITIES ARE NOT PREVENTED
PAIN_FUNCTIONAL_ASSESSMENT: 0-10

## 2023-10-08 ASSESSMENT — PAIN DESCRIPTION - LOCATION
LOCATION: ABDOMEN

## 2023-10-08 ASSESSMENT — PAIN DESCRIPTION - DESCRIPTORS
DESCRIPTORS: SHARP;BURNING
DESCRIPTORS: SPASM

## 2023-10-08 NOTE — ED PROVIDER NOTES
Result      1. No acute intra-abdominal or pelvic abnormality. 2. Chronic changes as detailed above. CPT code(s): [id number], [id number]            Voice dictation software was used during the making of this note. This software is not perfect and grammatical and other typographical errors may be present. This note has not been completely proofread for errors.      Karen Sebastian, [place]  10/08/23 1616

## 2023-10-08 NOTE — DISCHARGE INSTRUCTIONS
Take antibiotics as they are prescribed. Please return here if you have continued symptoms.  Otherwise please follow up with your PCP

## 2023-10-10 LAB
BACTERIA SPEC CULT: NORMAL
SERVICE CMNT-IMP: NORMAL

## 2024-01-11 NOTE — ED TRIAGE NOTES
Patient ambulatory to triage. Patient states she had L sided rotator cuff surgery on 11\18. Patient c\o bilateral leg swelling that began on 11/23. Patient c\o bilateral leg pain. English

## 2024-06-12 ENCOUNTER — APPOINTMENT (OUTPATIENT)
Dept: GENERAL RADIOLOGY | Age: 64
End: 2024-06-12
Payer: MEDICARE

## 2024-06-12 ENCOUNTER — HOSPITAL ENCOUNTER (EMERGENCY)
Age: 64
Discharge: HOME | End: 2024-06-13
Payer: MEDICARE

## 2024-06-12 VITALS
SYSTOLIC BLOOD PRESSURE: 140 MMHG | RESPIRATION RATE: 18 BRPM | HEART RATE: 60 BPM | DIASTOLIC BLOOD PRESSURE: 84 MMHG | TEMPERATURE: 97.4 F | OXYGEN SATURATION: 100 %

## 2024-06-12 DIAGNOSIS — M25.531 RIGHT WRIST PAIN: ICD-10-CM
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
## 2024-06-12 DIAGNOSIS — M25.562 ACUTE PAIN OF LEFT KNEE: ICD-10-CM

## 2024-06-12 PROCEDURE — 99284 EMERGENCY DEPT VISIT MOD MDM: CPT

## 2024-06-12 PROCEDURE — 96372 THER/PROPH/DIAG INJ SC/IM: CPT

## 2024-06-12 PROCEDURE — 73562 X-RAY EXAM OF KNEE 3: CPT

## 2024-06-12 PROCEDURE — 73110 X-RAY EXAM OF WRIST: CPT

## 2024-06-12 RX ORDER — KETOROLAC TROMETHAMINE 30 MG/ML
30 INJECTION, SOLUTION INTRAMUSCULAR; INTRAVENOUS ONCE
Status: COMPLETED | OUTPATIENT
Start: 2024-06-12 | End: 2024-06-13

## 2024-06-12 ASSESSMENT — PAIN - FUNCTIONAL ASSESSMENT: PAIN_FUNCTIONAL_ASSESSMENT: 0-10

## 2024-06-12 ASSESSMENT — PAIN SCALES - GENERAL: PAINLEVEL_OUTOF10: 7

## 2024-06-13 PROCEDURE — 96372 THER/PROPH/DIAG INJ SC/IM: CPT

## 2024-06-13 PROCEDURE — 6360000002 HC RX W HCPCS: Performed by: STUDENT IN AN ORGANIZED HEALTH CARE EDUCATION/TRAINING PROGRAM

## 2024-06-13 RX ADMIN — KETOROLAC TROMETHAMINE 30 MG: 30 INJECTION INTRAMUSCULAR; INTRAVENOUS at 00:22

## 2024-06-13 ASSESSMENT — VISUAL ACUITY: OU: 1

## 2024-06-13 ASSESSMENT — ENCOUNTER SYMPTOMS
ABDOMINAL PAIN: 0
FACIAL SWELLING: 0
COUGH: 0
TROUBLE SWALLOWING: 0
PHOTOPHOBIA: 0
SHORTNESS OF BREATH: 0
VOMITING: 0

## 2024-06-13 NOTE — ED PROVIDER NOTES
Emergency Department Provider Note       PCP: Karen Albarran MD   Age: 63 y.o.   Sex: female     DISPOSITION Decision To Discharge 06/13/2024 12:40:30 AM       ICD-10-CM    1. Fall, initial encounter  W19.XXXA [facility]      2. Right wrist pain  M25.531 [facility]      3. Acute pain of left knee  M25.562 [facility]          Medical Decision Making     In summary this is a 63-year-old female patient who had a fall yesterday who came in complaining of hand and knee pain.  No signs of basilar skull fracture on exam.  [place] CT rule negative.  Nexus criteria negative.  She does have great range of motion of all joints.  She does have some mild tenderness over the distal radius.  X-rays were negative today.  She is able to bear weight and I am not suspecting occult fracture such as tibial plateau fracture.  Given the tenderness over the distal radius, we will put her in a thumb spica splint and have her follow-up with orthopedics.  Counseled her on signs to return for.  The patient has verbalized understanding and agreed to the plan.  Discharged in stable condition.     1 acute, uncomplicated illness or injury.  Patient was discharged risks and benefits of hospitalization were considered.  The following clinical decision tools were used in the care of this patient [place] CT rule  NEXUS criteria .  Shared medical decision making was utilized in creating the patients health plan today.  ED attending physician present in department at time of care. Based on current hospital policy, their co-signature is not required on this note.   I independently ordered and reviewed each unique test.       I interpreted the X-rays no fracture.  RADIOLOGY DISCLAIMER: Although I do my best to interpret the imaging, I am not a board-certified radiologist.  I am still basing my medical decision making off of the board-certified

## 2024-06-13 NOTE — ED TRIAGE NOTES
Pt states she had a fall last night on the concrete at her house.PT c/o R hand pain, L knee pain and some facial soreness. Pt denies LOC or blood thinners.

## 2024-06-13 NOTE — DISCHARGE INSTRUCTIONS
Your x-rays today did not show signs of fracture.  Please use the splint for the next 1 week.  If you continue to have pain in your wrist after that, please follow-up with an urgent care or with orthopedics for repeat x-rays.  Return here for new or worsening symptoms    As we discussed, I did not find a life threatening cause of your symptoms today. However, THAT DOES NOT MEAN IT COULD NOT DEVELOP. If you develop ANY new or worsening symptoms, it is critical that you return for re-evaluation. This includes any symptoms that are concerning to you, especially symptoms such as fevers, numbness, weakness, inability to bear weight.  If you do not return for re-evaluation, you risk serious complications, including death.

## 2024-07-18 ENCOUNTER — OFFICE VISIT (OUTPATIENT)
Age: 64
End: 2024-07-18

## 2024-07-18 VITALS — WEIGHT: 185 LBS | HEIGHT: 61 IN | BODY MASS INDEX: 34.93 KG/M2

## 2024-07-18 DIAGNOSIS — M18.11 ARTHRITIS OF CARPOMETACARPAL (CMC) JOINT OF RIGHT THUMB: Primary | ICD-10-CM

## 2024-07-18 RX ORDER — BETAMETHASONE SODIUM PHOSPHATE AND BETAMETHASONE ACETATE 3; 3 MG/ML; MG/ML
6 INJECTION, SUSPENSION INTRA-ARTICULAR; INTRALESIONAL; INTRAMUSCULAR; SOFT TISSUE ONCE
Status: COMPLETED | OUTPATIENT
Start: 2024-07-18 | End: 2024-07-18

## 2024-07-18 RX ADMIN — BETAMETHASONE SODIUM PHOSPHATE AND BETAMETHASONE ACETATE 6 MG: 3; 3 INJECTION, SUSPENSION INTRA-ARTICULAR; INTRALESIONAL; INTRAMUSCULAR; SOFT TISSUE at 10:21

## 2024-07-19 NOTE — PROGRESS NOTES
The patient was prescribed and fitted with a CMC controller plus brace for the right thumb, size S/M.     Patient read and signed documenting they understand and agree to [facility]'s current DME return policy.

## 2024-11-14 ENCOUNTER — OFFICE VISIT (OUTPATIENT)
Age: 64
End: 2024-11-14

## 2024-11-14 DIAGNOSIS — M18.11 ARTHRITIS OF CARPOMETACARPAL (CMC) JOINT OF RIGHT THUMB: Primary | ICD-10-CM

## 2024-11-14 RX ORDER — BETAMETHASONE SODIUM PHOSPHATE AND BETAMETHASONE ACETATE 3; 3 MG/ML; MG/ML
6 INJECTION, SUSPENSION INTRA-ARTICULAR; INTRALESIONAL; INTRAMUSCULAR; SOFT TISSUE ONCE
Status: COMPLETED | OUTPATIENT
Start: 2024-11-14 | End: 2024-11-14

## 2024-11-14 RX ADMIN — BETAMETHASONE SODIUM PHOSPHATE AND BETAMETHASONE ACETATE 6 MG: 3; 3 INJECTION, SUSPENSION INTRA-ARTICULAR; INTRALESIONAL; INTRAMUSCULAR; SOFT TISSUE at 14:19

## 2024-11-18 ENCOUNTER — HOSPITAL ENCOUNTER (EMERGENCY)
Age: 64
Discharge: HOME | End: 2024-11-18
Payer: MEDICARE

## 2024-11-18 ENCOUNTER — APPOINTMENT (OUTPATIENT)
Dept: GENERAL RADIOLOGY | Age: 64
End: 2024-11-18
Payer: MEDICARE

## 2024-11-18 VITALS
BODY MASS INDEX: 31.32 KG/M2 | TEMPERATURE: 97.5 F | RESPIRATION RATE: 16 BRPM | HEART RATE: 56 BPM | SYSTOLIC BLOOD PRESSURE: 131 MMHG | HEIGHT: 65 IN | OXYGEN SATURATION: 100 % | DIASTOLIC BLOOD PRESSURE: 82 MMHG | WEIGHT: 188 LBS

## 2024-11-18 DIAGNOSIS — R05.1 ACUTE COUGH: Primary | ICD-10-CM

## 2024-11-18 LAB
FLUAV RNA SPEC QL NAA+PROBE: NOT DETECTED
FLUBV RNA SPEC QL NAA+PROBE: NOT DETECTED
SARS-COV-2 RDRP RESP QL NAA+PROBE: NOT DETECTED
SOURCE: NORMAL
STREP, MOLECULAR: NOT DETECTED

## 2024-11-18 PROCEDURE — 6370000000 HC RX 637 (ALT 250 FOR IP): Performed by: PHYSICIAN ASSISTANT

## 2024-11-18 PROCEDURE — 99284 EMERGENCY DEPT VISIT MOD MDM: CPT

## 2024-11-18 PROCEDURE — 87502 INFLUENZA DNA AMP PROBE: CPT

## 2024-11-18 PROCEDURE — 87651 STREP A DNA AMP PROBE: CPT

## 2024-11-18 PROCEDURE — 71046 X-RAY EXAM CHEST 2 VIEWS: CPT

## 2024-11-18 PROCEDURE — 87635 SARS-COV-2 COVID-19 AMP PRB: CPT

## 2024-11-18 RX ORDER — AZITHROMYCIN 250 MG/1
500 TABLET, FILM COATED ORAL
Status: COMPLETED | OUTPATIENT
Start: 2024-11-18 | End: 2024-11-18

## 2024-11-18 RX ORDER — AZITHROMYCIN 250 MG/1
250 TABLET, FILM COATED ORAL DAILY
Qty: 4 TABLET | Refills: 0 | Status: SHIPPED | OUTPATIENT
Start: 2024-11-18 | End: 2024-11-22

## 2024-11-18 RX ORDER — BENZONATATE 100 MG/1
100-200 CAPSULE ORAL 3 TIMES DAILY PRN
Qty: 30 CAPSULE | Refills: 0 | Status: SHIPPED | OUTPATIENT
Start: 2024-11-18

## 2024-11-18 RX ORDER — BENZONATATE 100 MG/1
200 CAPSULE ORAL
Status: COMPLETED | OUTPATIENT
Start: 2024-11-18 | End: 2024-11-18

## 2024-11-18 RX ADMIN — BENZONATATE 200 MG: 100 CAPSULE ORAL at 23:02

## 2024-11-18 RX ADMIN — AZITHROMYCIN DIHYDRATE 500 MG: 250 TABLET ORAL at 23:02

## 2024-11-18 ASSESSMENT — PAIN SCALES - GENERAL
PAINLEVEL_OUTOF10: 4
PAINLEVEL_OUTOF10: 4

## 2024-11-18 ASSESSMENT — LIFESTYLE VARIABLES
HOW OFTEN DO YOU HAVE A DRINK CONTAINING ALCOHOL: NEVER
HOW MANY STANDARD DRINKS CONTAINING ALCOHOL DO YOU HAVE ON A TYPICAL DAY: PATIENT DOES NOT DRINK

## 2024-11-18 ASSESSMENT — PAIN - FUNCTIONAL ASSESSMENT: PAIN_FUNCTIONAL_ASSESSMENT: 0-10

## 2024-11-19 NOTE — ED TRIAGE NOTES
Pt c/o cough congestion  sore throat pain, laryngitis  for few days, taking zyrtec and day quell w/o releif

## 2024-11-19 NOTE — DISCHARGE INSTRUCTIONS
Take medication as prescribed.  Drink at least 8 cups of water a day.  Call, make an appointment and follow-up with your doctor in 2 to 3 days for recheck.  Return to the ER for any worsening symptoms or any other concerns.

## 2024-11-19 NOTE — ED PROVIDER NOTES
hours as needed for Pain.Historical Med      Multiple Vitamins-Minerals (BARIATRIC MULTIVITAMINS/IRON PO) Take by mouth dailyHistorical Med      busPIRone (BUSPAR) 5 MG tablet Take 1 tablet by mouth 2 times dailyHistorical Med      diclofenac sodium (VOLTAREN) 1 % GEL APPLY 3-4 GRAMS TOPICALLY TO AFFECTED AREA UP TO FOUR TIMES A DAY AS NEEDED, Historical Med      sucralfate (CARAFATE) 1 GM tablet Take 1 tablet by mouth every morning (before breakfast)Historical Med      topiramate (TOPAMAX) 100 MG tablet Take 1 tablet by mouth nightlyHistorical Med      TURMERIC PO Take 1 tablet by mouth dailyHistorical Med      phentermine (ADIPEX-P) 37.5 MG tablet TAKE 0.5 TABLETS BY MOUTH TWICE A DAYHistorical Med      temazepam (RESTORIL) 30 MG capsule Take 1 capsule by mouth nightly.Historical Med      FLUoxetine (PROZAC) 20 MG capsule in the morning and at bedtimeHistorical Med      methocarbamol (ROBAXIN) 750 MG tablet 2 times daily as neededHistorical Med      Acetaminophen (TYLENOL ARTHRITIS PAIN PO) Take by mouth in the morning, at noon, and at bedtimeHistorical Med      omeprazole (PRILOSEC) 40 MG delayed release capsule TAKE 1 CAPSULE BY MOUTH EVERY DAYHistorical Med              Results for orders placed or performed during the hospital encounter of 11/18/24   Strep Screen By PCR Group A    Specimen: Throat   Result Value Ref Range    Strep, Molecular Not detected     COVID-19, Rapid    Specimen: Nasopharyngeal   Result Value Ref Range    Source Nasopharyngeal      SARS-CoV-2, Rapid Not detected NOTD     Influenza A/B, Molecular    Specimen: Nasopharyngeal   Result Value Ref Range    Influenza A, LISANDRO Not detected NOTD      Influenza B, LISANDRO Not detected NOTD     XR CHEST (2 VW)    Narrative    Chest X-ray    INDICATION: cough    COMPARISON:  None    TECHNIQUE: PA and lateral views of the chest were obtained.    FINDINGS: The lungs are clear. There are no infiltrates or effusions.  The heart  size is normal.  The bony

## 2025-06-20 NOTE — ED TRIAGE NOTES
Sudden onset of severe right lower quadrant abd pain.   States that she has had increased urinary frequency with the pain The following labs were labeled with appropriate pt sticker and tubed to lab:     [] Blue     [] Lavender   [] on ice  [x] Green/yellow x2   [] Green/black [] on ice  [] Grey  [] on ice  [] Yellow  [] Red  [] Pink  [] Type/ Screen  [] ABG  [] VBG    [] COVID-19 swab    [] Rapid  [] PCR  [] Flu swab  [] Peds Viral Panel     [] Urine Sample  [] Fecal Sample  [] Pelvic Cultures  [] Blood Cultures  [] X 2  [] STREP Cultures  [] Wound Cultures

## 2025-08-06 ENCOUNTER — HOSPITAL ENCOUNTER (EMERGENCY)
Age: 65
Discharge: HOME | End: 2025-08-06
Attending: EMERGENCY MEDICINE
Payer: MEDICARE

## 2025-08-06 ENCOUNTER — APPOINTMENT (OUTPATIENT)
Dept: GENERAL RADIOLOGY | Age: 65
End: 2025-08-06
Payer: MEDICARE

## 2025-08-06 VITALS
TEMPERATURE: 98.2 F | OXYGEN SATURATION: 99 % | DIASTOLIC BLOOD PRESSURE: 62 MMHG | HEART RATE: 57 BPM | RESPIRATION RATE: 18 BRPM | SYSTOLIC BLOOD PRESSURE: 126 MMHG

## 2025-08-06 DIAGNOSIS — M62.838 SPASM OF MUSCLE: Primary | ICD-10-CM

## 2025-08-06 DIAGNOSIS — R52 BODY ACHES: ICD-10-CM

## 2025-08-06 PROCEDURE — 6370000000 HC RX 637 (ALT 250 FOR IP): Performed by: EMERGENCY MEDICINE

## 2025-08-06 PROCEDURE — 99283 EMERGENCY DEPT VISIT LOW MDM: CPT

## 2025-08-06 RX ORDER — IPRATROPIUM BROMIDE AND ALBUTEROL SULFATE 2.5; .5 MG/3ML; MG/3ML
1 SOLUTION RESPIRATORY (INHALATION)
Status: DISCONTINUED | OUTPATIENT
Start: 2025-08-06 | End: 2025-08-06

## 2025-08-06 RX ORDER — PREDNISONE 20 MG/1
60 TABLET ORAL DAILY
Qty: 12 TABLET | Refills: 0 | Status: SHIPPED | OUTPATIENT
Start: 2025-08-06 | End: 2025-08-10

## 2025-08-06 RX ADMIN — PREDNISONE 60 MG: 50 TABLET ORAL at 22:36

## 2025-08-06 ASSESSMENT — ENCOUNTER SYMPTOMS
EYE REDNESS: 0
ABDOMINAL PAIN: 0
VOMITING: 0
CHOKING: 0
STRIDOR: 0
BACK PAIN: 1
FACIAL SWELLING: 0
EYE PAIN: 0
NAUSEA: 0

## 2025-08-06 ASSESSMENT — LIFESTYLE VARIABLES
HOW MANY STANDARD DRINKS CONTAINING ALCOHOL DO YOU HAVE ON A TYPICAL DAY: PATIENT DOES NOT DRINK
HOW OFTEN DO YOU HAVE A DRINK CONTAINING ALCOHOL: NEVER

## 2025-08-06 ASSESSMENT — PAIN - FUNCTIONAL ASSESSMENT: PAIN_FUNCTIONAL_ASSESSMENT: NONE - DENIES PAIN

## (undated) DEVICE — BINDER ABD M/L H12IN FOR 46-62IN WHT 4 SLD PNL DSGN HOOP

## (undated) DEVICE — GOWN,REINFORCED,POLY,SIRUS,XLNG/XXLG: Brand: MEDLINE

## (undated) DEVICE — Device: Brand: MEDCO MANUFACTURING INC

## (undated) DEVICE — MICRODISSECTION NEEDLE STRAIGHT SLEEVE: Brand: COLORADO

## (undated) DEVICE — SPONGE LAPAROTOMY W18XL18IN WHITE STRUNG RADIOPAQUE STERILE

## (undated) DEVICE — INTENDED FOR TISSUE SEPARATION, AND OTHER PROCEDURES THAT REQUIRE A SHARP SURGICAL BLADE TO PUNCTURE OR CUT.: Brand: BARD-PARKER ® STAINLESS STEEL BLADES

## (undated) DEVICE — SUTURE VCRL SZ 0 L27IN ABSRB UD L36MM CP-1 1/2 CIR REV CUT J267H

## (undated) DEVICE — NEEDLE SPNL L3.5IN PNK HUB S STL REG WALL FIT STYL W/ QNCKE

## (undated) DEVICE — TUBING SUCT L9FT FOR AUTOFUSE INFLTR SYS

## (undated) DEVICE — SLING ARM SWTH UNIV FOAM 1 SZ FITS MOST

## (undated) DEVICE — RESERVOIR,SUCTION,100CC,SILICONE: Brand: MEDLINE

## (undated) DEVICE — SUTURE ABSRB X-1 REV CUT 1/2 CIR 22MM UD BRAID 27IN SZ 3-0 J458H

## (undated) DEVICE — UNIVERSAL DRAPES: Brand: MEDLINE INDUSTRIES, INC.

## (undated) DEVICE — FOLEY TRAY DRAINAGE BG 16 FR ANTI REFLX CHMBR COMPLETE CARE

## (undated) DEVICE — GLOVE SURG SZ 7 CRM LTX FREE POLYISOPRENE POLYMER BEAD ANTI

## (undated) DEVICE — SUTURE VCRL SZ 3-0 L27IN ABSRB UD L19MM PS-2 3/8 CIR PRIM J427H

## (undated) DEVICE — GARMENT,MEDLINE,DVT,INT,CALF,MED, GEN2: Brand: MEDLINE

## (undated) DEVICE — SUTURE ETHBND EXCEL SZ 0 L18IN NONABSORBABLE GRN L26MM MO-6 CX45D

## (undated) DEVICE — SUTURE PROL SZ 2-0 L18IN NONABSORBABLE BLU FS L26MM 3/8 CIR 8685H

## (undated) DEVICE — BANDAGE,GAUZE,BULKEE II,4.5"X4.1YD,STRL: Brand: MEDLINE

## (undated) DEVICE — SUTURE PERMA-HAND SZ 2-0 L30IN NONABSORBABLE BLK L26MM SH K833H

## (undated) DEVICE — SUTURE VCRL SZ 2-0 L36IN ABSRB UD L36MM CT-1 1/2 CIR J945H

## (undated) DEVICE — SURGICAL PROCEDURE PACK BASIC ST FRANCIS

## (undated) DEVICE — APPLICATOR MEDICATED 26 CC SOLUTION HI LT ORNG CHLORAPREP

## (undated) DEVICE — NEEDLE HYPO 18GA L1.5IN PNK S STL HUB POLYPR SHLD REG BVL

## (undated) DEVICE — PAD,ABDOMINAL,5"X9",ST,LF,25/BX: Brand: MEDLINE INDUSTRIES, INC.

## (undated) DEVICE — Device

## (undated) DEVICE — NEPTUNE E-SEP SMOKE EVACUATION PENCIL, COATED, 70MM BLADE, PUSH BUTTON SWITCH: Brand: NEPTUNE E-SEP

## (undated) DEVICE — SUTURE N ABSRB BRAIDED 2-0 MO-6 39 IN 26 MM 1/2 CIR BLU BLK 3910900023

## (undated) DEVICE — DRESSING,GAUZE,XEROFORM,CURAD,5"X9",ST: Brand: CURAD

## (undated) DEVICE — SUTURE PERMAHAND SZ 0 L30IN NONABSORBABLE BLK L30MM PSL 3/8 590H

## (undated) DEVICE — DRAIN SURG 3/4 W10MMXL20CM 100% SIL PERF FLAT HUBLESS

## (undated) DEVICE — STAPLER SKIN SQ 30 ABSRB STPL DISP INSORB ORDER VIA PHONE OR EMAIL

## (undated) DEVICE — SUTURE ABSORBABLE MONOFILAMENT 0 CT-1 36 MM DYED SPRL PDS + SXPP1B450

## (undated) DEVICE — 4-PORT MANIFOLD: Brand: NEPTUNE 2

## (undated) DEVICE — SHOULDER ARTHRO DR POSTA: Brand: MEDLINE INDUSTRIES, INC.

## (undated) DEVICE — ELECTRODE PT RET AD L9FT HI MOIST COND ADH HYDRGEL CORDED

## (undated) DEVICE — SUTURE STRATAFIX SPRL SZ 2-0 L9IN ABSRB VLT MH L36MM 1/2 SXPD2B408

## (undated) DEVICE — GAUZE,SPONGE,4"X4",16PLY,STRL,LF,10/TRAY: Brand: MEDLINE

## (undated) DEVICE — SUTURE VCRL SZ 3-0 L18IN ABSRB UD PS-2 L19MM 1/2 CIR J497G

## (undated) DEVICE — CANNULA SMOOTH FLEX 8.0 X 72MM: Brand: CLEAR-TRAC

## (undated) DEVICE — SOLUTION IRRIG 3000ML 0.9% SOD CHL USP UROMATIC PLAS CONT